# Patient Record
Sex: FEMALE | Race: WHITE | NOT HISPANIC OR LATINO
[De-identification: names, ages, dates, MRNs, and addresses within clinical notes are randomized per-mention and may not be internally consistent; named-entity substitution may affect disease eponyms.]

---

## 2017-01-13 ENCOUNTER — APPOINTMENT (OUTPATIENT)
Dept: VASCULAR SURGERY | Facility: CLINIC | Age: 82
End: 2017-01-13

## 2017-01-13 VITALS
BODY MASS INDEX: 26.75 KG/M2 | WEIGHT: 151 LBS | DIASTOLIC BLOOD PRESSURE: 76 MMHG | TEMPERATURE: 97.9 F | HEIGHT: 63 IN | HEART RATE: 77 BPM | SYSTOLIC BLOOD PRESSURE: 156 MMHG

## 2017-01-18 ENCOUNTER — APPOINTMENT (OUTPATIENT)
Dept: WOUND CARE | Facility: CLINIC | Age: 82
End: 2017-01-18

## 2017-01-18 VITALS — WEIGHT: 151 LBS | HEIGHT: 63 IN | RESPIRATION RATE: 18 BRPM | BODY MASS INDEX: 26.75 KG/M2

## 2017-02-06 ENCOUNTER — APPOINTMENT (OUTPATIENT)
Dept: WOUND CARE | Facility: CLINIC | Age: 82
End: 2017-02-06

## 2017-02-06 VITALS
SYSTOLIC BLOOD PRESSURE: 182 MMHG | DIASTOLIC BLOOD PRESSURE: 92 MMHG | RESPIRATION RATE: 18 BRPM | TEMPERATURE: 98.4 F | HEART RATE: 74 BPM

## 2017-02-27 ENCOUNTER — APPOINTMENT (OUTPATIENT)
Dept: WOUND CARE | Facility: CLINIC | Age: 82
End: 2017-02-27

## 2017-02-27 VITALS
DIASTOLIC BLOOD PRESSURE: 78 MMHG | HEIGHT: 63 IN | WEIGHT: 151 LBS | TEMPERATURE: 97.6 F | BODY MASS INDEX: 26.75 KG/M2 | SYSTOLIC BLOOD PRESSURE: 179 MMHG | HEART RATE: 77 BPM | RESPIRATION RATE: 14 BRPM

## 2017-03-29 ENCOUNTER — APPOINTMENT (OUTPATIENT)
Dept: VASCULAR SURGERY | Facility: CLINIC | Age: 82
End: 2017-03-29

## 2017-03-29 VITALS
HEART RATE: 80 BPM | SYSTOLIC BLOOD PRESSURE: 183 MMHG | HEIGHT: 63 IN | TEMPERATURE: 98 F | WEIGHT: 151 LBS | BODY MASS INDEX: 26.75 KG/M2 | DIASTOLIC BLOOD PRESSURE: 91 MMHG

## 2017-04-03 ENCOUNTER — APPOINTMENT (OUTPATIENT)
Dept: WOUND CARE | Facility: CLINIC | Age: 82
End: 2017-04-03

## 2017-04-24 ENCOUNTER — APPOINTMENT (OUTPATIENT)
Dept: OPHTHALMOLOGY | Facility: CLINIC | Age: 82
End: 2017-04-24

## 2017-08-25 ENCOUNTER — INPATIENT (INPATIENT)
Facility: HOSPITAL | Age: 82
LOS: 4 days | Discharge: NURSING, REHAB OR LONG-TERM CARE | End: 2017-08-30
Attending: HOSPITALIST | Admitting: HOSPITALIST
Payer: MEDICARE

## 2017-08-25 VITALS
OXYGEN SATURATION: 100 % | HEART RATE: 103 BPM | RESPIRATION RATE: 20 BRPM | TEMPERATURE: 101 F | DIASTOLIC BLOOD PRESSURE: 100 MMHG | SYSTOLIC BLOOD PRESSURE: 180 MMHG

## 2017-08-25 DIAGNOSIS — R50.9 FEVER, UNSPECIFIED: ICD-10-CM

## 2017-08-25 DIAGNOSIS — Z87.81 PERSONAL HISTORY OF (HEALED) TRAUMATIC FRACTURE: Chronic | ICD-10-CM

## 2017-08-25 LAB
ALBUMIN SERPL ELPH-MCNC: 3.8 G/DL — SIGNIFICANT CHANGE UP (ref 3.3–5)
ALP SERPL-CCNC: 64 U/L — SIGNIFICANT CHANGE UP (ref 40–120)
ALT FLD-CCNC: 26 U/L — SIGNIFICANT CHANGE UP (ref 4–33)
APPEARANCE UR: CLEAR — SIGNIFICANT CHANGE UP
AST SERPL-CCNC: 50 U/L — HIGH (ref 4–32)
B PERT DNA SPEC QL NAA+PROBE: SIGNIFICANT CHANGE UP
BACTERIA # UR AUTO: SIGNIFICANT CHANGE UP
BASE EXCESS BLDV CALC-SCNC: 9.3 MMOL/L — SIGNIFICANT CHANGE UP
BILIRUB SERPL-MCNC: 0.8 MG/DL — SIGNIFICANT CHANGE UP (ref 0.2–1.2)
BILIRUB UR-MCNC: NEGATIVE — SIGNIFICANT CHANGE UP
BLOOD GAS VENOUS - CREATININE: 0.64 MG/DL — SIGNIFICANT CHANGE UP (ref 0.5–1.3)
BLOOD UR QL VISUAL: NEGATIVE — SIGNIFICANT CHANGE UP
BUN SERPL-MCNC: 16 MG/DL — SIGNIFICANT CHANGE UP (ref 7–23)
C PNEUM DNA SPEC QL NAA+PROBE: NOT DETECTED — SIGNIFICANT CHANGE UP
CALCIUM SERPL-MCNC: 9 MG/DL — SIGNIFICANT CHANGE UP (ref 8.4–10.5)
CHLORIDE BLDV-SCNC: 96 MMOL/L — SIGNIFICANT CHANGE UP (ref 96–108)
CHLORIDE SERPL-SCNC: 92 MMOL/L — LOW (ref 98–107)
CO2 SERPL-SCNC: 30 MMOL/L — SIGNIFICANT CHANGE UP (ref 22–31)
COLOR SPEC: SIGNIFICANT CHANGE UP
CREAT SERPL-MCNC: 0.68 MG/DL — SIGNIFICANT CHANGE UP (ref 0.5–1.3)
FLUAV H1 2009 PAND RNA SPEC QL NAA+PROBE: NOT DETECTED — SIGNIFICANT CHANGE UP
FLUAV H1 RNA SPEC QL NAA+PROBE: NOT DETECTED — SIGNIFICANT CHANGE UP
FLUAV H3 RNA SPEC QL NAA+PROBE: NOT DETECTED — SIGNIFICANT CHANGE UP
FLUAV SUBTYP SPEC NAA+PROBE: SIGNIFICANT CHANGE UP
FLUBV RNA SPEC QL NAA+PROBE: NOT DETECTED — SIGNIFICANT CHANGE UP
GAS PNL BLDV: 130 MMOL/L — LOW (ref 136–146)
GLUCOSE BLDV-MCNC: 102 — HIGH (ref 70–99)
GLUCOSE SERPL-MCNC: 99 MG/DL — SIGNIFICANT CHANGE UP (ref 70–99)
GLUCOSE UR-MCNC: NEGATIVE — SIGNIFICANT CHANGE UP
HADV DNA SPEC QL NAA+PROBE: NOT DETECTED — SIGNIFICANT CHANGE UP
HCO3 BLDV-SCNC: 30 MMOL/L — HIGH (ref 20–27)
HCOV 229E RNA SPEC QL NAA+PROBE: NOT DETECTED — SIGNIFICANT CHANGE UP
HCOV HKU1 RNA SPEC QL NAA+PROBE: NOT DETECTED — SIGNIFICANT CHANGE UP
HCOV NL63 RNA SPEC QL NAA+PROBE: NOT DETECTED — SIGNIFICANT CHANGE UP
HCOV OC43 RNA SPEC QL NAA+PROBE: NOT DETECTED — SIGNIFICANT CHANGE UP
HCT VFR BLD CALC: 44 % — SIGNIFICANT CHANGE UP (ref 34.5–45)
HCT VFR BLDV CALC: 45.1 % — HIGH (ref 34.5–45)
HGB BLD-MCNC: 14.5 G/DL — SIGNIFICANT CHANGE UP (ref 11.5–15.5)
HGB BLDV-MCNC: 14.7 G/DL — SIGNIFICANT CHANGE UP (ref 11.5–15.5)
HMPV RNA SPEC QL NAA+PROBE: NOT DETECTED — SIGNIFICANT CHANGE UP
HPIV1 RNA SPEC QL NAA+PROBE: NOT DETECTED — SIGNIFICANT CHANGE UP
HPIV2 RNA SPEC QL NAA+PROBE: NOT DETECTED — SIGNIFICANT CHANGE UP
HPIV3 RNA SPEC QL NAA+PROBE: NOT DETECTED — SIGNIFICANT CHANGE UP
HPIV4 RNA SPEC QL NAA+PROBE: NOT DETECTED — SIGNIFICANT CHANGE UP
KETONES UR-MCNC: NEGATIVE — SIGNIFICANT CHANGE UP
LACTATE BLDV-MCNC: 1.7 MMOL/L — SIGNIFICANT CHANGE UP (ref 0.5–2)
LEUKOCYTE ESTERASE UR-ACNC: NEGATIVE — SIGNIFICANT CHANGE UP
M PNEUMO DNA SPEC QL NAA+PROBE: NOT DETECTED — SIGNIFICANT CHANGE UP
MCHC RBC-ENTMCNC: 31.8 PG — SIGNIFICANT CHANGE UP (ref 27–34)
MCHC RBC-ENTMCNC: 33 % — SIGNIFICANT CHANGE UP (ref 32–36)
MCV RBC AUTO: 96.5 FL — SIGNIFICANT CHANGE UP (ref 80–100)
NITRITE UR-MCNC: NEGATIVE — SIGNIFICANT CHANGE UP
NRBC # FLD: 0 — SIGNIFICANT CHANGE UP
PCO2 BLDV: 60 MMHG — HIGH (ref 41–51)
PH BLDV: 7.38 PH — SIGNIFICANT CHANGE UP (ref 7.32–7.43)
PH UR: 7.5 — SIGNIFICANT CHANGE UP (ref 4.6–8)
PLATELET # BLD AUTO: 206 K/UL — SIGNIFICANT CHANGE UP (ref 150–400)
PMV BLD: 9.4 FL — SIGNIFICANT CHANGE UP (ref 7–13)
PO2 BLDV: 30 MMHG — LOW (ref 35–40)
POTASSIUM BLDV-SCNC: 3.8 MMOL/L — SIGNIFICANT CHANGE UP (ref 3.4–4.5)
POTASSIUM SERPL-MCNC: 4.5 MMOL/L — SIGNIFICANT CHANGE UP (ref 3.5–5.3)
POTASSIUM SERPL-SCNC: 4.5 MMOL/L — SIGNIFICANT CHANGE UP (ref 3.5–5.3)
PROT SERPL-MCNC: 7 G/DL — SIGNIFICANT CHANGE UP (ref 6–8.3)
PROT UR-MCNC: 300 — HIGH
RBC # BLD: 4.56 M/UL — SIGNIFICANT CHANGE UP (ref 3.8–5.2)
RBC # FLD: 14.2 % — SIGNIFICANT CHANGE UP (ref 10.3–14.5)
RSV RNA SPEC QL NAA+PROBE: NOT DETECTED — SIGNIFICANT CHANGE UP
RV+EV RNA SPEC QL NAA+PROBE: NOT DETECTED — SIGNIFICANT CHANGE UP
SAO2 % BLDV: 49 % — LOW (ref 60–85)
SODIUM SERPL-SCNC: 134 MMOL/L — LOW (ref 135–145)
SP GR SPEC: 1.01 — SIGNIFICANT CHANGE UP (ref 1–1.03)
UROBILINOGEN FLD QL: NORMAL E.U. — SIGNIFICANT CHANGE UP (ref 0.1–0.2)
WBC # BLD: 15.35 K/UL — HIGH (ref 3.8–10.5)
WBC # FLD AUTO: 15.35 K/UL — HIGH (ref 3.8–10.5)
WBC CASTS UR QL COMP ASSIST: SIGNIFICANT CHANGE UP (ref 0–?)
WBC UR QL: SIGNIFICANT CHANGE UP (ref 0–?)

## 2017-08-25 PROCEDURE — 71020: CPT | Mod: 26

## 2017-08-25 PROCEDURE — 99223 1ST HOSP IP/OBS HIGH 75: CPT | Mod: AI

## 2017-08-25 PROCEDURE — 71250 CT THORAX DX C-: CPT | Mod: 26

## 2017-08-25 RX ORDER — VANCOMYCIN HCL 1 G
1000 VIAL (EA) INTRAVENOUS ONCE
Qty: 0 | Refills: 0 | Status: COMPLETED | OUTPATIENT
Start: 2017-08-25 | End: 2017-08-25

## 2017-08-25 RX ORDER — ONDANSETRON 8 MG/1
4 TABLET, FILM COATED ORAL ONCE
Qty: 0 | Refills: 0 | Status: COMPLETED | OUTPATIENT
Start: 2017-08-25 | End: 2017-08-25

## 2017-08-25 RX ORDER — ACETAMINOPHEN 500 MG
650 TABLET ORAL ONCE
Qty: 0 | Refills: 0 | Status: DISCONTINUED | OUTPATIENT
Start: 2017-08-25 | End: 2017-08-25

## 2017-08-25 RX ORDER — SODIUM CHLORIDE 9 MG/ML
500 INJECTION INTRAMUSCULAR; INTRAVENOUS; SUBCUTANEOUS ONCE
Qty: 0 | Refills: 0 | Status: COMPLETED | OUTPATIENT
Start: 2017-08-25 | End: 2017-08-25

## 2017-08-25 RX ORDER — PIPERACILLIN AND TAZOBACTAM 4; .5 G/20ML; G/20ML
3.38 INJECTION, POWDER, LYOPHILIZED, FOR SOLUTION INTRAVENOUS ONCE
Qty: 0 | Refills: 0 | Status: COMPLETED | OUTPATIENT
Start: 2017-08-25 | End: 2017-08-25

## 2017-08-25 RX ORDER — ACETAMINOPHEN 500 MG
650 TABLET ORAL ONCE
Qty: 0 | Refills: 0 | Status: COMPLETED | OUTPATIENT
Start: 2017-08-25 | End: 2017-08-25

## 2017-08-25 RX ORDER — SODIUM CHLORIDE 9 MG/ML
250 INJECTION INTRAMUSCULAR; INTRAVENOUS; SUBCUTANEOUS ONCE
Qty: 0 | Refills: 0 | Status: DISCONTINUED | OUTPATIENT
Start: 2017-08-25 | End: 2017-08-25

## 2017-08-25 RX ADMIN — Medication 650 MILLIGRAM(S): at 12:47

## 2017-08-25 RX ADMIN — PIPERACILLIN AND TAZOBACTAM 200 GRAM(S): 4; .5 INJECTION, POWDER, LYOPHILIZED, FOR SOLUTION INTRAVENOUS at 18:58

## 2017-08-25 RX ADMIN — ONDANSETRON 4 MILLIGRAM(S): 8 TABLET, FILM COATED ORAL at 23:15

## 2017-08-25 RX ADMIN — Medication 250 MILLIGRAM(S): at 17:35

## 2017-08-25 RX ADMIN — SODIUM CHLORIDE 500 MILLILITER(S): 9 INJECTION INTRAMUSCULAR; INTRAVENOUS; SUBCUTANEOUS at 16:06

## 2017-08-25 NOTE — ED PROVIDER NOTE - OBJECTIVE STATEMENT
89 y/o F hx of asthma, sarcoidosis on 2 L NC, presents with fevers for one day. Pt says that she was in her usual state of health, until today, she did not feel well, and endorses malaise. Pt then started to have cold chills, and felt extremely cold after shower. Pt decided to call EMS because she was not feeling well. Pt endorses SOB, but pt say she is baseline SOB at exertion. Pt says there is no change in her SOB. Pt also has chronic dry cough, which is her baseline. Pt denies any CP, h/a, n/v/d, dysuria, hematuria, melena, hematochezia. Pt has RLE growth that was recently found a few months ago. Pt was to get bx of RLE growth. 89 y/o F hx of asthma, sarcoidosis on 2 L NC, colon ca s/p resection presents with fevers for one day. Pt says that she was in her usual state of health, until today, she did not feel well, and endorses malaise. Pt then started to have cold chills, and felt extremely cold after shower. Pt decided to call EMS because she was not feeling well. Pt endorses SOB, but pt say she is baseline SOB at exertion. Pt says there is no change in her SOB. Pt also has chronic dry cough, which is her baseline. Pt denies any CP, h/a, n/v/d, dysuria, hematuria, melena, hematochezia. Pt has RLE growth that was recently found a few months ago. Pt was to get bx of RLE growth.

## 2017-08-25 NOTE — ED PROVIDER NOTE - MEDICAL DECISION MAKING DETAILS
89 y/o F hx of asthma, sarcoidosis on 2 L NC, presents with SIRS criteria, no infectious source as of now. Will obtain CXR, blood cx, UA. Pt has leukocytosis but on chronic steroids. Consider RVP.

## 2017-08-25 NOTE — ED PROVIDER NOTE - ATTENDING CONTRIBUTION TO CARE
ED Attending Dr. Collazo: 89 yo female with asthma, sarcoidosis on home O2, colon cancer s/p resection, in ED with chills today.  Pt states that she has SOB at baseline, no worse today.  On exam pt chronically-ill appearing, heart RRR, lungs mildly coarse breath sounds throughout, abd NTND, extremities without swelling, strength 5/5 in all extremities and skin without rash. +dry mucous membranes

## 2017-08-25 NOTE — ED ADULT NURSE NOTE - OBJECTIVE STATEMENT
pt to rm 20. alert,oriented x3. skin warm,dry,dryness to skin note with multiple areas discoloration. pt states present " x yrs,been to dermatologist,keratosis/skin cancers" r lower leg with  small red/ulcer,sl redness to surrounding area. cleaned covered. pt reports nausea  since yesterday. denies vomiting,pain. reports  " shakes this am".   denies cough,problems urinating. pt reports " always  a little short of breath,uses o2 as needed at home"  eval by md at present

## 2017-08-25 NOTE — ED ADULT TRIAGE NOTE - CHIEF COMPLAINT QUOTE
Brought in by ems from home for chills, fevers, productive cough and body aches. States " I haven't been feeling well the last few days". Lips appear dry, states she did not drink water today. Hx of copd, asthma, htn. Uses home 02  2 L NC.

## 2017-08-25 NOTE — ED PROVIDER NOTE - PSH
Delivery with history of     History of cataract surgery  right eye 2012  History of colon resection  , cancer never confirmed per patient  History of left shoulder fracture  , fell down stairs, also fracture of pelvis  Inguinal hernia    S/P hip replacement  left   Shoulder disorder  fell shoulder surgery left

## 2017-08-25 NOTE — ED PROVIDER NOTE - PMH
COPD (chronic obstructive pulmonary disease)    Hernia    Hyperlipemia    Hypertension    Leg ulcer  History of multiple slow-healing leg ulcers, all healed as of March 2016  Osteoporosis  multiple fractures  Sarcoidosis  pulmonary  Shingles  2012

## 2017-08-26 DIAGNOSIS — I10 ESSENTIAL (PRIMARY) HYPERTENSION: ICD-10-CM

## 2017-08-26 DIAGNOSIS — E87.1 HYPO-OSMOLALITY AND HYPONATREMIA: ICD-10-CM

## 2017-08-26 DIAGNOSIS — D86.9 SARCOIDOSIS, UNSPECIFIED: ICD-10-CM

## 2017-08-26 DIAGNOSIS — G47.00 INSOMNIA, UNSPECIFIED: ICD-10-CM

## 2017-08-26 DIAGNOSIS — R63.8 OTHER SYMPTOMS AND SIGNS CONCERNING FOOD AND FLUID INTAKE: ICD-10-CM

## 2017-08-26 DIAGNOSIS — Z29.9 ENCOUNTER FOR PROPHYLACTIC MEASURES, UNSPECIFIED: ICD-10-CM

## 2017-08-26 DIAGNOSIS — L97.909 NON-PRESSURE CHRONIC ULCER OF UNSPECIFIED PART OF UNSPECIFIED LOWER LEG WITH UNSPECIFIED SEVERITY: ICD-10-CM

## 2017-08-26 DIAGNOSIS — R50.9 FEVER, UNSPECIFIED: ICD-10-CM

## 2017-08-26 DIAGNOSIS — J45.30 MILD PERSISTENT ASTHMA, UNCOMPLICATED: ICD-10-CM

## 2017-08-26 DIAGNOSIS — A41.9 SEPSIS, UNSPECIFIED ORGANISM: ICD-10-CM

## 2017-08-26 LAB
BACTERIA UR CULT: SIGNIFICANT CHANGE UP
BUN SERPL-MCNC: 12 MG/DL — SIGNIFICANT CHANGE UP (ref 7–23)
CALCIUM SERPL-MCNC: 8.4 MG/DL — SIGNIFICANT CHANGE UP (ref 8.4–10.5)
CHLORIDE SERPL-SCNC: 94 MMOL/L — LOW (ref 98–107)
CO2 SERPL-SCNC: 29 MMOL/L — SIGNIFICANT CHANGE UP (ref 22–31)
CREAT SERPL-MCNC: 0.52 MG/DL — SIGNIFICANT CHANGE UP (ref 0.5–1.3)
GLUCOSE SERPL-MCNC: 91 MG/DL — SIGNIFICANT CHANGE UP (ref 70–99)
HCT VFR BLD CALC: 41 % — SIGNIFICANT CHANGE UP (ref 34.5–45)
HGB BLD-MCNC: 13.4 G/DL — SIGNIFICANT CHANGE UP (ref 11.5–15.5)
MCHC RBC-ENTMCNC: 31.2 PG — SIGNIFICANT CHANGE UP (ref 27–34)
MCHC RBC-ENTMCNC: 32.7 % — SIGNIFICANT CHANGE UP (ref 32–36)
MCV RBC AUTO: 95.3 FL — SIGNIFICANT CHANGE UP (ref 80–100)
NRBC # FLD: 0 — SIGNIFICANT CHANGE UP
PLATELET # BLD AUTO: 166 K/UL — SIGNIFICANT CHANGE UP (ref 150–400)
PMV BLD: 9.4 FL — SIGNIFICANT CHANGE UP (ref 7–13)
POTASSIUM SERPL-MCNC: 4.2 MMOL/L — SIGNIFICANT CHANGE UP (ref 3.5–5.3)
POTASSIUM SERPL-SCNC: 4.2 MMOL/L — SIGNIFICANT CHANGE UP (ref 3.5–5.3)
RBC # BLD: 4.3 M/UL — SIGNIFICANT CHANGE UP (ref 3.8–5.2)
RBC # FLD: 14.5 % — SIGNIFICANT CHANGE UP (ref 10.3–14.5)
SODIUM SERPL-SCNC: 134 MMOL/L — LOW (ref 135–145)
SPECIMEN SOURCE: SIGNIFICANT CHANGE UP
WBC # BLD: 8.81 K/UL — SIGNIFICANT CHANGE UP (ref 3.8–10.5)
WBC # FLD AUTO: 8.81 K/UL — SIGNIFICANT CHANGE UP (ref 3.8–10.5)

## 2017-08-26 PROCEDURE — 99233 SBSQ HOSP IP/OBS HIGH 50: CPT

## 2017-08-26 RX ORDER — CEFTRIAXONE 500 MG/1
INJECTION, POWDER, FOR SOLUTION INTRAMUSCULAR; INTRAVENOUS
Qty: 0 | Refills: 0 | Status: DISCONTINUED | OUTPATIENT
Start: 2017-08-26 | End: 2017-08-27

## 2017-08-26 RX ORDER — AZITHROMYCIN 500 MG/1
500 TABLET, FILM COATED ORAL ONCE
Qty: 0 | Refills: 0 | Status: COMPLETED | OUTPATIENT
Start: 2017-08-26 | End: 2017-08-26

## 2017-08-26 RX ORDER — ALPRAZOLAM 0.25 MG
0.25 TABLET ORAL AT BEDTIME
Qty: 0 | Refills: 0 | Status: DISCONTINUED | OUTPATIENT
Start: 2017-08-26 | End: 2017-08-30

## 2017-08-26 RX ORDER — IPRATROPIUM/ALBUTEROL SULFATE 18-103MCG
3 AEROSOL WITH ADAPTER (GRAM) INHALATION EVERY 6 HOURS
Qty: 0 | Refills: 0 | Status: DISCONTINUED | OUTPATIENT
Start: 2017-08-26 | End: 2017-08-30

## 2017-08-26 RX ORDER — MONTELUKAST 4 MG/1
10 TABLET, CHEWABLE ORAL DAILY
Qty: 0 | Refills: 0 | Status: DISCONTINUED | OUTPATIENT
Start: 2017-08-26 | End: 2017-08-30

## 2017-08-26 RX ORDER — CEFTRIAXONE 500 MG/1
1 INJECTION, POWDER, FOR SOLUTION INTRAMUSCULAR; INTRAVENOUS ONCE
Qty: 0 | Refills: 0 | Status: DISCONTINUED | OUTPATIENT
Start: 2017-08-26 | End: 2017-08-27

## 2017-08-26 RX ORDER — AZITHROMYCIN 500 MG/1
TABLET, FILM COATED ORAL
Qty: 0 | Refills: 0 | Status: DISCONTINUED | OUTPATIENT
Start: 2017-08-26 | End: 2017-08-26

## 2017-08-26 RX ORDER — ERGOCALCIFEROL 1.25 MG/1
50000 CAPSULE ORAL
Qty: 0 | Refills: 0 | Status: DISCONTINUED | OUTPATIENT
Start: 2017-08-26 | End: 2017-08-30

## 2017-08-26 RX ORDER — AZITHROMYCIN 500 MG/1
250 TABLET, FILM COATED ORAL DAILY
Qty: 0 | Refills: 0 | Status: DISCONTINUED | OUTPATIENT
Start: 2017-08-27 | End: 2017-08-28

## 2017-08-26 RX ORDER — SIMVASTATIN 20 MG/1
10 TABLET, FILM COATED ORAL AT BEDTIME
Qty: 0 | Refills: 0 | Status: DISCONTINUED | OUTPATIENT
Start: 2017-08-26 | End: 2017-08-30

## 2017-08-26 RX ORDER — CEFTRIAXONE 500 MG/1
1 INJECTION, POWDER, FOR SOLUTION INTRAMUSCULAR; INTRAVENOUS ONCE
Qty: 0 | Refills: 0 | Status: COMPLETED | OUTPATIENT
Start: 2017-08-26 | End: 2017-08-26

## 2017-08-26 RX ORDER — CEFTRIAXONE 500 MG/1
INJECTION, POWDER, FOR SOLUTION INTRAMUSCULAR; INTRAVENOUS
Qty: 0 | Refills: 0 | Status: DISCONTINUED | OUTPATIENT
Start: 2017-08-26 | End: 2017-08-26

## 2017-08-26 RX ORDER — ENOXAPARIN SODIUM 100 MG/ML
30 INJECTION SUBCUTANEOUS EVERY 24 HOURS
Qty: 0 | Refills: 0 | Status: DISCONTINUED | OUTPATIENT
Start: 2017-08-26 | End: 2017-08-30

## 2017-08-26 RX ORDER — CEFTRIAXONE 500 MG/1
1 INJECTION, POWDER, FOR SOLUTION INTRAMUSCULAR; INTRAVENOUS EVERY 24 HOURS
Qty: 0 | Refills: 0 | Status: DISCONTINUED | OUTPATIENT
Start: 2017-08-27 | End: 2017-08-27

## 2017-08-26 RX ADMIN — Medication 1 TABLET(S): at 12:10

## 2017-08-26 RX ADMIN — SIMVASTATIN 10 MILLIGRAM(S): 20 TABLET, FILM COATED ORAL at 21:47

## 2017-08-26 RX ADMIN — AZITHROMYCIN 250 MILLIGRAM(S): 500 TABLET, FILM COATED ORAL at 02:10

## 2017-08-26 RX ADMIN — Medication 5 MILLIGRAM(S): at 05:54

## 2017-08-26 RX ADMIN — MONTELUKAST 10 MILLIGRAM(S): 4 TABLET, CHEWABLE ORAL at 12:10

## 2017-08-26 RX ADMIN — Medication 0.25 MILLIGRAM(S): at 22:43

## 2017-08-26 RX ADMIN — CEFTRIAXONE 100 GRAM(S): 500 INJECTION, POWDER, FOR SOLUTION INTRAMUSCULAR; INTRAVENOUS at 01:40

## 2017-08-26 NOTE — H&P ADULT - PROBLEM SELECTOR PLAN 1
Febrile, leukocytosis (may be due to steroids), no clear source, CXR and UA unremarkable, no GI symptoms. Ordered CT chest as patient w/ increased SOB Xweeks, treat for CAP for now, if negative will d/c and observe. Febrile, leukocytosis (may be due to steroids), no clear source, CXR and UA unremarkable, no GI symptoms. Ordered CT chest as patient w/ increased SOB Xweeks, treat for CAP for now (ceftriaxone and azithro) if negative will d/c and observe off antibiotics.

## 2017-08-26 NOTE — H&P ADULT - ASSESSMENT
Patient is a 89 y/o F PMH Asthma (daily duonebs), Sarcoidosis on 2 L NC, Colon CA s/p resection, HTN, HLD, p/w fevers X1 day

## 2017-08-26 NOTE — H&P ADULT - NSHPPHYSICALEXAM_GEN_ALL_CORE
T(C): 36.8 (08-25-17 @ 22:36), Max: 39.6 (08-25-17 @ 12:47)  HR: 79 (08-25-17 @ 22:36) (60 - 103)  BP: 134/89 (08-25-17 @ 22:36) (114/52 - 180/100)  RR: 20 (08-25-17 @ 22:36) (18 - 31)  SpO2: 100% (08-25-17 @ 22:36) (97% - 100%)      GENERAL: NAD, well-developed, well-nourished  HEAD:  Atraumatic, Normocephalic  HEENT: EOMI, PERRLA, conjunctiva and sclera clear  NECK: Supple, No JVD  CHEST/LUNG: Clear to auscultation bilaterally; No wheezes, rales or rhonchi  HEART: Regular rate and rhythm; No murmurs, rubs, or gallops, (+)S1, S2  ABDOMEN: Soft, Nontender, Nondistended; Normal Bowel sounds   EXTREMITIES:  2+ Peripheral Pulses, No clubbing, cyanosis, or edema  PSYCH: normal mood and affect  NEUROLOGY: AAOx3, non-focal  SKIN: No rashes or lesions

## 2017-08-26 NOTE — H&P ADULT - HISTORY OF PRESENT ILLNESS
Patient is a 89 y/o F PMH Asthma (daily duonebs), Sarcoidosis on 2 L NC, Colon CA s/p resection, HTN, HLD, p/w fevers X1 day. Patient reports feeling ill last night (8/24). Cannot describe, however, reports feeling tired. Woke up this AM (8/25) and after her shower experienced severe chills/rigors, was concerned that she would fall and came to ER. Reports baseline SOB and cough. Is on prednisone 5 mg PO daily [id number] for her Sarcoidosis. Denies any N/V/chest pain/palp/abd pain/changes in BM/dysuria. No sick contacts, no travel history.

## 2017-08-26 NOTE — PROVIDER CONTACT NOTE (OTHER) - ACTION/TREATMENT ORDERED:
Provider aware, Zofran 4mg IV Push once ordered Provider aware, Zofran 4mg IV Push once ordered by provider

## 2017-08-26 NOTE — H&P ADULT - NSHPLABSRESULTS_GEN_ALL_CORE
134<L>  |  92<L>  |  16  ----------------------------<  99  4.5   |  30  |  0.68    Ca    9.0      25 Aug 2017 12:05    TPro  7.0  /  Alb  3.8  /  TBili  0.8  /  DBili  x   /  AST  50<H>  /  ALT  26  /  AlkPhos  64                              14.5   15.35 )-----------( 206      ( 25 Aug 2017 12:05 )             44.0       LIVER FUNCTIONS - ( 25 Aug 2017 12:05 )  Alb: 3.8 g/dL / Pro: 7.0 g/dL / ALK PHOS: 64 u/L / ALT: 26 u/L / AST: 50 u/L / GGT: x             Urinalysis Basic - ( 25 Aug 2017 12:40 )    Color: PLYEL / Appearance: CLEAR / S.013 / pH: 7.5  Gluc: NEGATIVE / Ketone: NEGATIVE  / Bili: NEGATIVE / Urobili: NORMAL E.U.   Blood: NEGATIVE / Protein: 300 / Nitrite: NEGATIVE   Leuk Esterase: NEGATIVE / RBC: x / WBC 0-2   Sq Epi: x / Non Sq Epi: x / Bacteria: FEW      12:05 - VBG - pH: 7.38  | pCO2: 60    | pO2: 30    | Lactate: 1.7    EKG personally reviewed, NSR, HR 70s w/ PACs, no sig change from 2016

## 2017-08-27 ENCOUNTER — TRANSCRIPTION ENCOUNTER (OUTPATIENT)
Age: 82
End: 2017-08-27

## 2017-08-27 LAB — PROCALCITONIN SERPL-MCNC: 0.62 NG/ML — HIGH (ref 0–0.04)

## 2017-08-27 PROCEDURE — 99233 SBSQ HOSP IP/OBS HIGH 50: CPT

## 2017-08-27 RX ADMIN — Medication 1 TABLET(S): at 06:12

## 2017-08-27 RX ADMIN — MONTELUKAST 10 MILLIGRAM(S): 4 TABLET, CHEWABLE ORAL at 12:34

## 2017-08-27 RX ADMIN — Medication 1 TABLET(S): at 17:21

## 2017-08-27 RX ADMIN — Medication 1 TABLET(S): at 12:34

## 2017-08-27 RX ADMIN — Medication 5 MILLIGRAM(S): at 06:12

## 2017-08-27 RX ADMIN — AZITHROMYCIN 250 MILLIGRAM(S): 500 TABLET, FILM COATED ORAL at 13:20

## 2017-08-27 RX ADMIN — SIMVASTATIN 10 MILLIGRAM(S): 20 TABLET, FILM COATED ORAL at 21:30

## 2017-08-27 RX ADMIN — Medication 0.25 MILLIGRAM(S): at 21:30

## 2017-08-27 NOTE — PROGRESS NOTE ADULT - PROBLEM SELECTOR PLAN 7
No IVF  Replete electrolytes as needed  Regular Diet   Dispo pending clinical progress No IVF  Replete electrolytes as needed  Regular Diet   DISPO Pending clinical status and PT evaluation   Dispo pending clinical progress No IVF  Replete electrolytes as needed  Regular Diet   DISPO Pending clinical status and PT evaluation for weakness

## 2017-08-27 NOTE — DISCHARGE NOTE ADULT - HOSPITAL COURSE
89 y/o F PMH Asthma (daily duonebs), Sarcoidosis on 2 L NC, also prednisone, Colon CA s/p resection, HTN, HLD, with c/o fever, malaise found to have Pneumonia on CT chest. 89 y/o F PMH Asthma (daily duonebs), Sarcoidosis on 2 L NC, also prednisone, Colon CA s/p resection, HTN, HLD, with c/o fever, malaise found to have Pneumonia on CT chest.  treated w/ abx- seen by pulm- pt w/ restrictive lung disease- did not get higher dose of prednisone.  pt to go to rehab for deconditioning.  overwhelmed that her "lungs are bad" but is known to have chronic lung issues and that her family support is limited.

## 2017-08-27 NOTE — DISCHARGE NOTE ADULT - MEDICATION SUMMARY - MEDICATIONS TO TAKE
I will START or STAY ON the medications listed below when I get home from the hospital:    predniSONE 5 mg oral tablet  -- 1 tab(s) by mouth once a day  -- Indication: For Sarcoidosis    enalapril 5 mg oral tablet  -- 1 tab(s) by mouth once a day  -- Indication: For HTN    Zocor 10 mg oral tablet  -- 1 tab(s) by mouth once a day (at bedtime)  -- Indication: For HLD     ALPRAZolam 0.25 mg oral tablet  -- 1 tab(s) by mouth once a day (at bedtime)  -- Indication: For Anxiety     Ventolin HFA 90 mcg/inh inhalation aerosol  -- 2 puff(s) inhaled 4 times a day, As Needed  -- Indication: For COPD (chronic obstructive pulmonary disease)    albuterol-ipratropium 2.5 mg-0.5 mg/3 mL inhalation solution  -- 3 milliliter(s) inhaled every 6 hours, As needed, Shortness of Breath and/or Wheezing  -- Indication: For COPD (chronic obstructive pulmonary disease)    polyethylene glycol 3350 oral powder for reconstitution  -- 17 gram(s) by mouth once a day  -- Indication: For Constipation    docusate sodium 100 mg oral capsule  -- 1 cap(s) by mouth 3 times a day  -- Indication: For Constipation    senna oral tablet  -- 2 tab(s) by mouth once a day (at bedtime)  -- Indication: For Constipation    Singulair 10 mg oral tablet  -- 1 tab(s) by mouth once a day  -- Indication: For COPD    ocular lubricant ophthalmic solution  -- 1 drop(s) to each affected eye every 6 hours, As needed, Dry Eyes  -- Indication: For Dry eyes     levoFLOXacin 500 mg oral tablet  -- 1 tab(s) by mouth every 24 hours  dc after 8/31 dose  -- Indication: For PNA     Multiple Vitamins oral tablet  -- 1 tab(s) by mouth once a day  -- Indication: For Supplement     Vitamin D2 50,000 intl units (1.25 mg) oral capsule  -- 1 cap(s) by mouth once a week on Wednesday  -- Indication: For Supplement

## 2017-08-27 NOTE — DISCHARGE NOTE ADULT - PATIENT PORTAL LINK FT
“You can access the [facility] Patient Portal, offered by [facility], by registering with the following website: [email or web address]”

## 2017-08-27 NOTE — DISCHARGE NOTE ADULT - PLAN OF CARE
Resolved follow up with your doctor within 1 week of discharge without difficulty breathing continue Nebulizer and supplemental oxygen SBP<150 continue Prednisone continue Zocor Improved

## 2017-08-27 NOTE — DISCHARGE NOTE ADULT - SECONDARY DIAGNOSIS.
COPD (chronic obstructive pulmonary disease) Essential hypertension Sarcoidosis Hyponatremia Hyperlipemia

## 2017-08-27 NOTE — DISCHARGE NOTE ADULT - CARE PLAN
Principal Discharge DX:	Sepsis due to pneumonia  Goal:	Resolved  Instructions for follow-up, activity and diet:	follow up with your doctor within 1 week of discharge  Secondary Diagnosis:	COPD (chronic obstructive pulmonary disease)  Goal:	without difficulty breathing  Instructions for follow-up, activity and diet:	continue Nebulizer and supplemental oxygen  Secondary Diagnosis:	Essential hypertension  Goal:	SBP<150  Secondary Diagnosis:	Sarcoidosis  Instructions for follow-up, activity and diet:	continue Prednisone  Secondary Diagnosis:	Hyponatremia  Secondary Diagnosis:	Hyperlipemia  Instructions for follow-up, activity and diet:	continue Zocor Principal Discharge DX:	Sepsis due to pneumonia  Goal:	Resolved  Instructions for follow-up, activity and diet:	follow up with your doctor within 1 week of discharge  Secondary Diagnosis:	COPD (chronic obstructive pulmonary disease)  Goal:	without difficulty breathing  Instructions for follow-up, activity and diet:	continue Nebulizer and supplemental oxygen  Secondary Diagnosis:	Essential hypertension  Goal:	SBP<150  Secondary Diagnosis:	Sarcoidosis  Instructions for follow-up, activity and diet:	continue Prednisone  Secondary Diagnosis:	Hyponatremia  Goal:	Improved  Secondary Diagnosis:	Hyperlipemia  Instructions for follow-up, activity and diet:	continue Zocor

## 2017-08-28 LAB
BUN SERPL-MCNC: 19 MG/DL — SIGNIFICANT CHANGE UP (ref 7–23)
CALCIUM SERPL-MCNC: 8.5 MG/DL — SIGNIFICANT CHANGE UP (ref 8.4–10.5)
CHLORIDE SERPL-SCNC: 96 MMOL/L — LOW (ref 98–107)
CO2 SERPL-SCNC: 35 MMOL/L — HIGH (ref 22–31)
CREAT SERPL-MCNC: 0.56 MG/DL — SIGNIFICANT CHANGE UP (ref 0.5–1.3)
GLUCOSE SERPL-MCNC: 104 MG/DL — HIGH (ref 70–99)
HCT VFR BLD CALC: 43.5 % — SIGNIFICANT CHANGE UP (ref 34.5–45)
HGB BLD-MCNC: 13.8 G/DL — SIGNIFICANT CHANGE UP (ref 11.5–15.5)
MCHC RBC-ENTMCNC: 31.6 PG — SIGNIFICANT CHANGE UP (ref 27–34)
MCHC RBC-ENTMCNC: 31.7 % — LOW (ref 32–36)
MCV RBC AUTO: 99.5 FL — SIGNIFICANT CHANGE UP (ref 80–100)
NRBC # FLD: 0 — SIGNIFICANT CHANGE UP
PLATELET # BLD AUTO: 183 K/UL — SIGNIFICANT CHANGE UP (ref 150–400)
PMV BLD: 9.2 FL — SIGNIFICANT CHANGE UP (ref 7–13)
POTASSIUM SERPL-MCNC: 4.5 MMOL/L — SIGNIFICANT CHANGE UP (ref 3.5–5.3)
POTASSIUM SERPL-SCNC: 4.5 MMOL/L — SIGNIFICANT CHANGE UP (ref 3.5–5.3)
RBC # BLD: 4.37 M/UL — SIGNIFICANT CHANGE UP (ref 3.8–5.2)
RBC # FLD: 14 % — SIGNIFICANT CHANGE UP (ref 10.3–14.5)
SODIUM SERPL-SCNC: 138 MMOL/L — SIGNIFICANT CHANGE UP (ref 135–145)
WBC # BLD: 6.62 K/UL — SIGNIFICANT CHANGE UP (ref 3.8–10.5)
WBC # FLD AUTO: 6.62 K/UL — SIGNIFICANT CHANGE UP (ref 3.8–10.5)

## 2017-08-28 PROCEDURE — 99233 SBSQ HOSP IP/OBS HIGH 50: CPT

## 2017-08-28 RX ORDER — DOCUSATE SODIUM 100 MG
100 CAPSULE ORAL THREE TIMES A DAY
Qty: 0 | Refills: 0 | Status: DISCONTINUED | OUTPATIENT
Start: 2017-08-28 | End: 2017-08-30

## 2017-08-28 RX ORDER — SENNA PLUS 8.6 MG/1
2 TABLET ORAL AT BEDTIME
Qty: 0 | Refills: 0 | Status: DISCONTINUED | OUTPATIENT
Start: 2017-08-28 | End: 2017-08-30

## 2017-08-28 RX ORDER — POLYETHYLENE GLYCOL 3350 17 G/17G
17 POWDER, FOR SOLUTION ORAL DAILY
Qty: 0 | Refills: 0 | Status: DISCONTINUED | OUTPATIENT
Start: 2017-08-28 | End: 2017-08-30

## 2017-08-28 RX ORDER — CEFTRIAXONE 500 MG/1
1 INJECTION, POWDER, FOR SOLUTION INTRAMUSCULAR; INTRAVENOUS EVERY 24 HOURS
Qty: 0 | Refills: 0 | Status: DISCONTINUED | OUTPATIENT
Start: 2017-08-28 | End: 2017-08-28

## 2017-08-28 RX ADMIN — Medication 3 MILLILITER(S): at 07:29

## 2017-08-28 RX ADMIN — Medication 0.25 MILLIGRAM(S): at 22:53

## 2017-08-28 RX ADMIN — Medication 1 TABLET(S): at 12:15

## 2017-08-28 RX ADMIN — Medication 5 MILLIGRAM(S): at 05:52

## 2017-08-28 RX ADMIN — ERGOCALCIFEROL 50000 UNIT(S): 1.25 CAPSULE ORAL at 09:19

## 2017-08-28 RX ADMIN — SIMVASTATIN 10 MILLIGRAM(S): 20 TABLET, FILM COATED ORAL at 22:54

## 2017-08-28 RX ADMIN — MONTELUKAST 10 MILLIGRAM(S): 4 TABLET, CHEWABLE ORAL at 12:15

## 2017-08-28 RX ADMIN — AZITHROMYCIN 250 MILLIGRAM(S): 500 TABLET, FILM COATED ORAL at 12:15

## 2017-08-28 RX ADMIN — Medication 1 TABLET(S): at 05:52

## 2017-08-28 RX ADMIN — Medication 100 MILLIGRAM(S): at 22:50

## 2017-08-28 NOTE — CONSULT NOTE ADULT - ASSESSMENT
Mr. 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 s/p resection, HTN, presents here for a fever for 1-2 days, and mild worsening of her sputum.     From her hx, PFTs, and imaging, the patient has severe lung disease from her sarcoidosis. The CT shows significant distortion of the lung parenchymal and some fibrosis. Also shows a small new consolidation, suggestive of possible infection. Given her fevers, initial leukocytosis, it is reasonable to treat for a pneumonia. The patient is very hesitant to increase her dose of steroids as in the past she had multiple side effects. Given distortion and fibrosis without any significant GGOs on CT, I do not think that increasing her steroids will help improve her sx. She is not wheezing at the moment either. Increasing her steroids may worsen her skin disease and healing of her ulcers.     She likely also has significant pHTN from the size of the pulm artery on CT. Do not see any ECHOs our system, however, this is chronic and not contributing to her acute fevers.     - agree with short course of levofloxacin - total of 5 d. do not think she needs MRSA coverage right now.   - duonebs daily [per patient - home dose]   - c/w her home singular   - c/w home prednisone 5 mg qd.   - will try to get more collateral from her pulmonologist re: pHTN, past treatments, etc.   - after d/c she should f/u with Dr. Jeong.    will discuss with attg, Dr. Marie in the morning.     Ti Cai MD   Pulmonary Fellow Mr. 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 s/p resection, HTN, presents here for a fever for 1-2 days, and mild worsening of her sputum.     From her hx, PFTs, and imaging, the patient has severe lung disease from her sarcoidosis. The CT shows significant distortion of the lung parenchymal and some fibrosis. Also shows a small new consolidation, suggestive of possible infection. Given her fevers, initial leukocytosis, it is reasonable to treat for a pneumonia. The patient is very hesitant to increase her dose of steroids as in the past she had multiple side effects. Given distortion and fibrosis without any significant GGOs on CT, I do not think that increasing her steroids will help improve her sx. She is not wheezing at the moment either. Increasing her steroids may worsen her skin disease and healing of her ulcers.     She likely also has significant pHTN from the size of the pulm artery on CT. Do not see any ECHOs our system, however, this is chronic and not contributing to her acute fevers.     - agree with short course of levofloxacin - total of 7 d. do not think she needs MRSA coverage right now.   - duonebs daily [per patient - home dose]   - c/w her home singular   - c/w home prednisone 5 mg qd.   - will try to get more collateral from her pulmonologist re: pHTN, past treatments, etc.   - after d/c she should f/u with Dr. Jeong.      Ti Cai MD   Pulmonary Fellow

## 2017-08-28 NOTE — CONSULT NOTE ADULT - SUBJECTIVE AND OBJECTIVE BOX
CHIEF COMPLAINT: shortness of breath, ?pneumonia.     HPI: Ms. 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 on chronic prednisone 5 mg qd for the last month, colon cancer s/p resection,    PAST MEDICAL & SURGICAL HISTORY:  Leg ulcer: History of multiple slow-healing leg ulcers, all healed as of 2016  Osteoporosis: multiple fractures  Hernia  Shingles:   Sarcoidosis: pulmonary  Hypertension  COPD (chronic obstructive pulmonary disease)  Hyperlipemia  History of left shoulder fracture: , fell down stairs, also fracture of pelvis  Shoulder disorder: fell shoulder surgery left   Delivery with history of   History of cataract surgery: right eye   S/P hip replacement: left   Inguinal hernia  History of colon resection: , cancer never confirmed per patient      FAMILY HISTORY:  No significant family history      SOCIAL HISTORY:  Smoking: __ packs x ___ years  EtOH Use:  Marital Status:  Occupation:  Recent Travel:  Country of Birth:  Advance Directives:    Allergies  No Known Allergies    HOME MEDICATIONS:      REVIEW OF SYSTEMS:  Constitutional:   Eyes:  ENT:  CV:  Resp:  GI:  :  MSK:  Integumentary:  Neurological:  Psychiatric:  Endocrine:  Hematologic/Lymphatic:  Allergic/Immunologic:  [ ] All other systems negative  [ ] Unable to assess ROS because ________    OBJECTIVE:  ICU Vital Signs Last 24 Hrs  T(C): 37.1 (28 Aug 2017 05:14), Max: 37.2 (27 Aug 2017 22:00)  T(F): 98.7 (28 Aug 2017 05:14), Max: 99 (27 Aug 2017 22:00)  HR: 84 (28 Aug 2017 07:29) (77 - 90)  BP: 124/62 (28 Aug 2017 07:44) (124/62 - 164/78)  RR: 18 (28 Aug 2017 05:14) (18 - 18)  SpO2: 100% (28 Aug 2017 05:14) (99% - 100%)    CAPILLARY BLOOD GLUCOSE      PHYSICAL EXAM:  General:   HEENT:   Lymph Nodes:  Neck:   Respiratory:   Cardiovascular:   Abdomen:   Extremities:   Skin:   Neurological:  Psychiatry:    HOSPITAL MEDICATIONS:  MEDICATIONS  (STANDING):  enoxaparin Injectable 30 milliGRAM(s) SubCutaneous every 24 hours  predniSONE   Tablet 5 milliGRAM(s) Oral daily  enalapril 5 milliGRAM(s) Oral daily  simvastatin 10 milliGRAM(s) Oral at bedtime  ALPRAZolam 0.25 milliGRAM(s) Oral at bedtime  montelukast 10 milliGRAM(s) Oral daily  multivitamin 1 Tablet(s) Oral daily  ergocalciferol 10724 Unit(s) Oral <User Schedule>  levoFLOXacin  Tablet 500 milliGRAM(s) Oral every 24 hours    MEDICATIONS  (PRN):  ALBUTerol/ipratropium for Nebulization 3 milliLiter(s) Nebulizer every 6 hours PRN Shortness of Breath and/or Wheezing  artificial  tears Solution 1 Drop(s) Both EYES every 6 hours PRN Dry Eyes      LABS:                        13.8   6.62  )-----------( 183      ( 28 Aug 2017 06:00 )             43.5     08-28    138  |  96<L>  |  19  ----------------------------<  104<H>  4.5   |  35<H>  |  0.56    Ca    8.5      28 Aug 2017 06:00                MICROBIOLOGY:     RADIOLOGY:  [ ] Reviewed and interpreted by me    PULMONARY FUNCTION TESTS: CHIEF COMPLAINT: shortness of breath, ?pneumonia.     HPI: Ms. 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 s/p resection, HTN, presents here for a fever for 1-2 days.     Patient has been dx with sarcoid decades ago with a biopsy?. She was initially on prednisone but then was tapered and seems like self discontinued 15-20 years ago. Then one year ago, she was placed on home O2 2 L. At baseline, she has very low exercise tolerance, gets dyspneic with just ambulation around the house or 1 block. She lives alone is functional. She was started on prednisone again 1-2 months ago and now tapered to 5 mg daily. Also takes duonebs x1 daily.     Follows up with a private pulmonologist Dr. Jeong pul- [phone]- pt w/ restrictive lung dz, FEV1 0.51 36%.     Now, she comes in with chills and rigors, found to have fevers. She received a CT thorax, which showed significant distortion of her lung parenchymal and possible small new LLL and RLL consolidations. Currently she is on levofloxacin. Patient feels better as her fevers have improved.    She is very hesitant to increase her prednisone.      PAST MEDICAL & SURGICAL HISTORY:  Leg ulcer: History of multiple slow-healing leg ulcers, all healed as of 2016  Osteoporosis: multiple fractures  Hernia  Shingles:   Sarcoidosis: pulmonary  Hypertension  COPD (chronic obstructive pulmonary disease)  Hyperlipemia  History of left shoulder fracture: , fell down stairs, also fracture of pelvis  Shoulder disorder: fell shoulder surgery left   Delivery with history of   History of cataract surgery: right eye   S/P hip replacement: left   Inguinal hernia  History of colon resection: , cancer never confirmed per patient      FAMILY HISTORY:  No significant family history      SOCIAL HISTORY:  Smoking: never  EtOH Use: none  Advance Directives:    Allergies  No Known Allergies    HOME MEDICATIONS:      REVIEW OF SYSTEMS:  Constitutional: see HPI  Eyes: neg  ENT: neg  CV: see HPI  Resp: see HPI  GI: neg  : neg  MSK: neg  Integumentary:  see HPI  Neurological:  neg  Psychiatric: neg  Endocrine: neg  Hematologic/Lymphatic: neg  Allergic/Immunologic: neg  [x] All other systems negative    OBJECTIVE:  ICU Vital Signs Last 24 Hrs  T(C): 37.1 (28 Aug 2017 05:14), Max: 37.2 (27 Aug 2017 22:00)  T(F): 98.7 (28 Aug 2017 05:14), Max: 99 (27 Aug 2017 22:00)  HR: 84 (28 Aug 2017 07:29) (77 - 90)  BP: 124/62 (28 Aug 2017 07:44) (124/62 - 164/78)  RR: 18 (28 Aug 2017 05:14) (18 - 18)  SpO2: 100% (28 Aug 2017 05:14) (99% - 100%)    CAPILLARY BLOOD GLUCOSE      PHYSICAL EXAM:  General: NAD. sitting up comfortably in a chair. On 2.5 L of NC.   HEENT: clear OP.  Lymph Nodes: no significant cervical LN.   Respiratory: mild bibasilar inspiratory crackles. no significant wheezing.  Cardiovascular: nl rate and reg rhythm. nl s1, loud p2.   Abdomen: soft. nt. nd.   Extremities: no edema.  Skin: +skin ecchymoses throughout, foot ulcers.   Neurological: AOx3.    HOSPITAL MEDICATIONS:  MEDICATIONS  (STANDING):  enoxaparin Injectable 30 milliGRAM(s) SubCutaneous every 24 hours  predniSONE   Tablet 5 milliGRAM(s) Oral daily  enalapril 5 milliGRAM(s) Oral daily  simvastatin 10 milliGRAM(s) Oral at bedtime  ALPRAZolam 0.25 milliGRAM(s) Oral at bedtime  montelukast 10 milliGRAM(s) Oral daily  multivitamin 1 Tablet(s) Oral daily  ergocalciferol 37611 Unit(s) Oral <User Schedule>  levoFLOXacin  Tablet 500 milliGRAM(s) Oral every 24 hours    MEDICATIONS  (PRN):  ALBUTerol/ipratropium for Nebulization 3 milliLiter(s) Nebulizer every 6 hours PRN Shortness of Breath and/or Wheezing  artificial  tears Solution 1 Drop(s) Both EYES every 6 hours PRN Dry Eyes      LABS:                        13.8   6.62  )-----------( 183      ( 28 Aug 2017 06:00 )             43.5     08-    138  |  96<L>  |  19  ----------------------------<  104<H>  4.5   |  35<H>  |  0.56    Ca    8.5      28 Aug 2017 06:00      MICROBIOLOGY:  RVP: negative     RADIOLOGY:  CT thorax: New small airspace opacity within the left lower lobe concerning for infection. Worsening airspace opacity within the right lower lobe with associated cystic component, etiology unclear. Short-term follow-up is recommended.      PULMONARY FUNCTION TESTS:  restrictive lung dz, FEV1 0.51 36%. CHIEF COMPLAINT: shortness of breath, ?pneumonia.     HPI: Ms. 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 s/p resection, HTN, presents here for a fever for 1-2 days.     Patient has been dx with sarcoid decades ago with a biopsy?. She was initially on prednisone but then was tapered and seems like self discontinued 15-20 years ago. Then one year ago, she was placed on home O2 2 L. At baseline, she has very low exercise tolerance, gets dyspneic with just ambulation around the house or 1 block. She lives alone is functional. She was started on prednisone again 1-2 months ago and now tapered to 5 mg daily. Also takes duonebs x1 daily.     Follows up with a private pulmonologist Dr. Jeong pul- [phone]- pt w/ restrictive lung dz, FEV1 0.51 36%.     Now, she comes in with chills and rigors, found to have fevers. She chronically has a mild cough, but slight increased in her sputum. She received a CT thorax, which showed significant distortion of her lung parenchymal and possible small new LLL and RLL consolidations. Currently she is on levofloxacin. Patient feels better as her fevers have improved.    She is very hesitant to increase her prednisone.      PAST MEDICAL & SURGICAL HISTORY:  Leg ulcer: History of multiple slow-healing leg ulcers, all healed as of 2016  Osteoporosis: multiple fractures  Hernia  Shingles:   Sarcoidosis: pulmonary  Hypertension  COPD (chronic obstructive pulmonary disease)  Hyperlipemia  History of left shoulder fracture: , fell down stairs, also fracture of pelvis  Shoulder disorder: fell shoulder surgery left   Delivery with history of   History of cataract surgery: right eye 2012  S/P hip replacement: left   Inguinal hernia  History of colon resection: , cancer never confirmed per patient      FAMILY HISTORY:  No significant family history      SOCIAL HISTORY:  Smoking: never  EtOH Use: none  Advance Directives:    Allergies  No Known Allergies    HOME MEDICATIONS:      REVIEW OF SYSTEMS:  Constitutional: see HPI  Eyes: neg  ENT: neg  CV: see HPI  Resp: see HPI  GI: neg  : neg  MSK: neg  Integumentary:  see HPI  Neurological:  neg  Psychiatric: neg  Endocrine: neg  Hematologic/Lymphatic: neg  Allergic/Immunologic: neg  [x] All other systems negative    OBJECTIVE:  ICU Vital Signs Last 24 Hrs  T(C): 37.1 (28 Aug 2017 05:14), Max: 37.2 (27 Aug 2017 22:00)  T(F): 98.7 (28 Aug 2017 05:14), Max: 99 (27 Aug 2017 22:00)  HR: 84 (28 Aug 2017 07:29) (77 - 90)  BP: 124/62 (28 Aug 2017 07:44) (124/62 - 164/78)  RR: 18 (28 Aug 2017 05:14) (18 - 18)  SpO2: 100% (28 Aug 2017 05:14) (99% - 100%)    CAPILLARY BLOOD GLUCOSE      PHYSICAL EXAM:  General: NAD. sitting up comfortably in a chair. On 2.5 L of NC.   HEENT: clear OP.  Lymph Nodes: no significant cervical LN.   Respiratory: mild bibasilar inspiratory crackles. no significant wheezing.  Cardiovascular: nl rate and reg rhythm. nl s1, loud p2.   Abdomen: soft. nt. nd.   Extremities: no edema.  Skin: +skin ecchymoses throughout, foot ulcers.   Neurological: AOx3.    HOSPITAL MEDICATIONS:  MEDICATIONS  (STANDING):  enoxaparin Injectable 30 milliGRAM(s) SubCutaneous every 24 hours  predniSONE   Tablet 5 milliGRAM(s) Oral daily  enalapril 5 milliGRAM(s) Oral daily  simvastatin 10 milliGRAM(s) Oral at bedtime  ALPRAZolam 0.25 milliGRAM(s) Oral at bedtime  montelukast 10 milliGRAM(s) Oral daily  multivitamin 1 Tablet(s) Oral daily  ergocalciferol 14374 Unit(s) Oral <User Schedule>  levoFLOXacin  Tablet 500 milliGRAM(s) Oral every 24 hours    MEDICATIONS  (PRN):  ALBUTerol/ipratropium for Nebulization 3 milliLiter(s) Nebulizer every 6 hours PRN Shortness of Breath and/or Wheezing  artificial  tears Solution 1 Drop(s) Both EYES every 6 hours PRN Dry Eyes      LABS:                        13.8   6.62  )-----------( 183      ( 28 Aug 2017 06:00 )             43.5     08    138  |  96<L>  |  19  ----------------------------<  104<H>  4.5   |  35<H>  |  0.56    Ca    8.5      28 Aug 2017 06:00      MICROBIOLOGY:  RVP: negative     RADIOLOGY:  CT thorax: New small airspace opacity within the left lower lobe concerning for infection. Worsening airspace opacity within the right lower lobe with associated cystic component, etiology unclear. Short-term follow-up is recommended.      PULMONARY FUNCTION TESTS:  restrictive lung dz, FEV1 0.51 36%.

## 2017-08-28 NOTE — CONSULT NOTE ADULT - ATTENDING COMMENTS
Pt seen and examined with fellow, agree with above A+P. Acute on chronic resp failure with hypoxia 2/2 advanced sarcoidosis and likely pulm hypertension, now with acute bacterial pneumonia. Clinically improving on Levaquin, would suggest completing a 7 day course. Cont home dose of steroids.

## 2017-08-28 NOTE — PROGRESS NOTE ADULT - PROBLEM SELECTOR PLAN 7
No IVF  Replete electrolytes as needed  Regular Diet   DISPO Pending clinical status and PT evaluation for weakness

## 2017-08-29 PROCEDURE — 99233 SBSQ HOSP IP/OBS HIGH 50: CPT

## 2017-08-29 PROCEDURE — 99223 1ST HOSP IP/OBS HIGH 75: CPT | Mod: GC

## 2017-08-29 RX ORDER — IPRATROPIUM/ALBUTEROL SULFATE 18-103MCG
3 AEROSOL WITH ADAPTER (GRAM) INHALATION
Qty: 0 | Refills: 0 | COMMUNITY
Start: 2017-08-29

## 2017-08-29 RX ORDER — SENNA PLUS 8.6 MG/1
2 TABLET ORAL
Qty: 0 | Refills: 0 | COMMUNITY
Start: 2017-08-29

## 2017-08-29 RX ORDER — POLYETHYLENE GLYCOL 3350 17 G/17G
17 POWDER, FOR SOLUTION ORAL
Qty: 0 | Refills: 0 | COMMUNITY
Start: 2017-08-29

## 2017-08-29 RX ORDER — DOCUSATE SODIUM 100 MG
1 CAPSULE ORAL
Qty: 0 | Refills: 0 | COMMUNITY
Start: 2017-08-29

## 2017-08-29 RX ADMIN — Medication 1 TABLET(S): at 12:21

## 2017-08-29 RX ADMIN — Medication 100 MILLIGRAM(S): at 06:18

## 2017-08-29 RX ADMIN — Medication 5 MILLIGRAM(S): at 06:18

## 2017-08-29 RX ADMIN — SIMVASTATIN 10 MILLIGRAM(S): 20 TABLET, FILM COATED ORAL at 22:32

## 2017-08-29 RX ADMIN — Medication 100 MILLIGRAM(S): at 22:32

## 2017-08-29 RX ADMIN — MONTELUKAST 10 MILLIGRAM(S): 4 TABLET, CHEWABLE ORAL at 12:21

## 2017-08-29 RX ADMIN — Medication 3 MILLILITER(S): at 09:49

## 2017-08-29 RX ADMIN — Medication 0.25 MILLIGRAM(S): at 22:32

## 2017-08-29 NOTE — PROGRESS NOTE ADULT - PROBLEM SELECTOR PLAN 6
Lovenox 30 mg SQ for DVT ppx

## 2017-08-30 VITALS
DIASTOLIC BLOOD PRESSURE: 58 MMHG | OXYGEN SATURATION: 99 % | RESPIRATION RATE: 20 BRPM | SYSTOLIC BLOOD PRESSURE: 108 MMHG | TEMPERATURE: 99 F | HEART RATE: 85 BPM

## 2017-08-30 LAB
BACTERIA BLD CULT: SIGNIFICANT CHANGE UP
BACTERIA BLD CULT: SIGNIFICANT CHANGE UP

## 2017-08-30 PROCEDURE — 99233 SBSQ HOSP IP/OBS HIGH 50: CPT

## 2017-08-30 RX ORDER — ACETAMINOPHEN 500 MG
650 TABLET ORAL ONCE
Qty: 0 | Refills: 0 | Status: COMPLETED | OUTPATIENT
Start: 2017-08-30 | End: 2017-08-30

## 2017-08-30 RX ADMIN — Medication 650 MILLIGRAM(S): at 08:04

## 2017-08-30 RX ADMIN — MONTELUKAST 10 MILLIGRAM(S): 4 TABLET, CHEWABLE ORAL at 12:25

## 2017-08-30 RX ADMIN — Medication 3 MILLILITER(S): at 10:10

## 2017-08-30 RX ADMIN — Medication 650 MILLIGRAM(S): at 09:04

## 2017-08-30 RX ADMIN — Medication 1 TABLET(S): at 12:25

## 2017-08-30 RX ADMIN — Medication 5 MILLIGRAM(S): at 05:23

## 2017-08-30 NOTE — PROGRESS NOTE ADULT - PROBLEM SELECTOR PLAN 4
c/w home Prednisone PO 5 mg qD.
c/w home Prednisone PO 5 mg qD.
c/w home Prednisone PO 5 mg qD. Given underlying restrictive lung disease low threshold for treating pulmonary infections. levaquin for now to complete 5 days of treatment
c/w home Prednisone PO 5 mg qD. Given underlying lung disease low threshold for treating pulmonary infections. c/w Azithromycin and Ceftriaxone
c/w home Prednisone PO 5 mg qD. Given underlying lung disease low threshold for treating pulmonary infections. c/w Azithromycin and Ceftriaxone

## 2017-08-30 NOTE — PROGRESS NOTE ADULT - PROBLEM SELECTOR PLAN 2
Patient with hx of asthma, continue with DuoNebs q6h.
Patient with hx of asthma, continue with DuoNebs q4h.
Patient with hx of asthma, continue with DuoNebs q4h.

## 2017-08-30 NOTE — PROVIDER CONTACT NOTE (OTHER) - REASON
BP
Pt c/o nausea
follow up BP
Patient without IV access and has IV antibiotic due. Pt refusing placement of a new IV
Pt c/o of headache 5/10 and nausea. Manual /70

## 2017-08-30 NOTE — PROGRESS NOTE ADULT - PROBLEM SELECTOR PLAN 3
c/w Enalapril 5 mg PO qD. BP satisfactory, c/w lipitor 10 mg PO qD for cholesterol

## 2017-08-30 NOTE — PROGRESS NOTE ADULT - ASSESSMENT
89F hx of Asthma, Sarcoidosis, Colon CA s/p resection, HTN, HLD presenting with fevers, rigors, and worsened dyspnea x 1 day-admitted for superimposed community acquired pneumonia.
89F hx of Asthma, Sarcoidosis, Colon CA s/p resection, HTN, HLD presenting with fevers, rigors, and worsened dyspnea x 1 day-admitted for superimposed community acquired pneumonia. Pt now deconditioned- would rather go to rehab
89F hx of Asthma, Sarcoidosis, Colon CA s/p resection, HTN, HLD presenting with fevers, rigors, and worsened dyspnea x 1 day-admitted for superimposed community acquired pneumonia. Pt now deconditioned- would rather go to rehab- for d/c to rehab today- time 40 min- pt lacks social support- is overwhelmed about what will happen when she goes home
89F hx of Asthma, Sarcoidosis, Colon CA s/p resection, HTN, HLD presenting with fevers, rigors, and worsened dyspnea x 1 day. Given chronic steroids, fever, and leukocytosis, suspicion for superimposed community acquired pneumonia.
89F hx of Asthma, Sarcoidosis, Colon CA s/p resection, HTN, HLD presenting with fevers, rigors, and worsened dyspnea x 1 day. Given chronic steroids, fever, and leukocytosis, suspicion for superimposed community acquired pneumonia.

## 2017-08-30 NOTE — PROGRESS NOTE ADULT - PROBLEM SELECTOR PLAN 5
As per patient, is seeing a dermatologist for evaluation and possible biopsy. At first glance appears like an actinic keratosis, patient has a follow up appointment with her Dermatologist in September.
As per patient, is seeing a dermatologist for evaluation and possible biopsy. At first glance appears like an actinic keratosis, patient has a follow up appointment with her Dermatologist in September.
Lovenox 30 mg SQ for DVT ppx
As per patient, is seeing a dermatologist for evaluation and possible biopsy. At first glance appears like an actinic keratosis, patient has a follow up appointment with her Dermatologist in September.
As per patient, is seeing a dermatologist for evaluation and possible biopsy. At first glance appears like an actinic keratosis, patient has a follow up appointment with her Dermatologist in September.

## 2017-08-30 NOTE — PROGRESS NOTE ADULT - PROBLEM SELECTOR PLAN 1
Negative RVP, (+) procalcitonin, Blood cultures are 24h negative, and CT Chest has evidence of pneumonia. Will treat with levaquin for 2 more days. Pulmonary called as pt not agreeable to increasing dose of steroids.  Monitor respiratory status, OOB to chair, incentive spirometry
sepsis resolved.    completing course of levaquin for total of 5 days for CAP.    Pulmonary did not advise higher dose of steroids.  Monitor respiratory status, OOB to chair, incentive spirometry
sepsis resolved.    completing course of levaquin for total of 7 days for CAP.    Pulmonary did not advise higher dose of steroids.  Monitor respiratory status, OOB to chair, incentive spirometry
Patient with no fever since at home s/p Ceftriaxone and Azithromycin. Leukocytosis and fevers and rigors improved. Negative RVP, (+) procalcitonin, Blood cultures are 24h negative, and CT Chest has evidence of pneumonia. Will treat with Augmentin/Azithromycin for now. Monitor respiratory status, OOB to chair, incentive spirometry, CBC daily and vitals q4h.
Patient with no fever since at home s/p Ceftriaxone and Azithromycin. Leukocytosis and fevers and rigors improved. Negative RVP - and CT Chest has some evidence of air-space disease. Will treat with Ceftriaxone Azithromycin for now and await Blood Cultures and Procalcitonin. Monitor respiratory status, OOB to chair, CBC daily and vitals q4h.

## 2017-08-30 NOTE — PROGRESS NOTE ADULT - SUBJECTIVE AND OBJECTIVE BOX
INTERNAL MEDICINE ADS ATTENDING - PROGRESS NOTE    S: Last night felt she had chills and "shakes" in the setting of a fever of 100.4 at home. Now not feeling fevers or chills and less dyspnea, although her baseline does consist of dyspnea.     REVIEW OF SYSTEMS:    CONSTITUTIONAL: No weakness, fevers or chills  EYES/ENT: No visual changes;  No vertigo or throat pain   NECK: No pain or stiffness  RESPIRATORY: Dyspnea worse than her baseline   CARDIOVASCULAR: No chest pain or palpitations  GASTROINTESTINAL: No abdominal or epigastric pain. No nausea, vomiting, or hematemesis; No diarrhea or constipation. No melena or hematochezia.  GENITOURINARY: No dysuria, frequency or hematuria  NEUROLOGICAL: No numbness or weakness  SKIN: No itching, burning, rashes, or lesions   All other review of systems is negative unless indicated above.      enoxaparin Injectable 30 milliGRAM(s) SubCutaneous every 24 hours  predniSONE   Tablet 5 milliGRAM(s) Oral daily  enalapril 5 milliGRAM(s) Oral daily  simvastatin 10 milliGRAM(s) Oral at bedtime  ALPRAZolam 0.25 milliGRAM(s) Oral at bedtime  ALBUTerol/ipratropium for Nebulization 3 milliLiter(s) Nebulizer every 6 hours PRN  montelukast 10 milliGRAM(s) Oral daily  multivitamin 1 Tablet(s) Oral daily  ergocalciferol 78860 Unit(s) Oral <User Schedule>      O:  T(C): 36.4 (08-26-17 @ 05:51), Max: 39.6 (08-25-17 @ 12:47)  HR: 74 (08-26-17 @ 05:51) (60 - 103)  BP: 116/75 (08-26-17 @ 05:51) (114/52 - 180/100)  RR: 18 (08-26-17 @ 05:51) (18 - 31)  SpO2: 100% (08-26-17 @ 05:51) (97% - 100%)    08-25-17 @ 07:01  -  08-26-17 @ 07:00  --------------------------------------------------------  IN: 250 mL / OUT: 0 mL / NET: 250 mL      General: NAD, non-toxic appearing, elderly, well kept, speaking in full sentences   HEENT: EOMI, PERRLA, no conjunctival pallor, MMM, no JVD, no thyromegaly, neck supple, trachea midline  CV: S1S2 RRR no MRG  Lungs: CTA BL  Abdomen: soft NTND +BS   Extremeties: No CCE +WWP  Skin/MSK: No rashes, preserved ROM on active & passive movement, RLE hyperkeratotic lesion with eschar at base, and lateral malleoli erythema neither lesion is painful, b/l UE echymoses   Neuro: AAOx3, no focal deficits (5/5 strength all extremities) no sensory deficits                           13.4   8.81  )-----------( 166      ( 26 Aug 2017 06:15 )             41.0     08-26    134<L>  |  94<L>  |  12  ----------------------------<  91  4.2   |  29  |  0.52    Ca    8.4      26 Aug 2017 06:15    TPro  7.0  /  Alb  3.8  /  TBili  0.8  /  DBili  x   /  AST  50<H>  /  ALT  26  /  AlkPhos  64  08-25    CT Chest - no consolidations, b/l air space disease with centrilobular abnormalities - awaiting official radiology read
INTERNAL MEDICINE ADS ATTENDING - PROGRESS NOTE    S: Patient not having fevers/chills. However she feels more "weak" than her baseline. This concerns her because she lives on her own.     REVIEW OF SYSTEMS:    CONSTITUTIONAL: (+) Weakness, No fevers or chills  EYES/ENT: No visual changes;  No vertigo or throat pain   NECK: No pain or stiffness  RESPIRATORY: No cough, wheezing, hemoptysis; (+) chronic shortness of breath that is slightly worse than usual   CARDIOVASCULAR: No chest pain or palpitations  GASTROINTESTINAL: No abdominal or epigastric pain. No nausea, vomiting, or hematemesis; No diarrhea or constipation. No melena or hematochezia.  GENITOURINARY: No dysuria, frequency or hematuria  NEUROLOGICAL: No numbness or weakness  SKIN: No itching, burning, rashes, or lesions   All other review of systems is negative unless indicated above.      enoxaparin Injectable 30 milliGRAM(s) SubCutaneous every 24 hours  predniSONE   Tablet 5 milliGRAM(s) Oral daily  enalapril 5 milliGRAM(s) Oral daily  simvastatin 10 milliGRAM(s) Oral at bedtime  ALPRAZolam 0.25 milliGRAM(s) Oral at bedtime  ALBUTerol/ipratropium for Nebulization 3 milliLiter(s) Nebulizer every 6 hours PRN  montelukast 10 milliGRAM(s) Oral daily  multivitamin 1 Tablet(s) Oral daily  ergocalciferol 51547 Unit(s) Oral <User Schedule>  azithromycin   Tablet 250 milliGRAM(s) Oral daily  amoxicillin  500 milliGRAM(s)/clavulanate 1 Tablet(s) Oral two times a day      O:  T(C): 37.1 (08-27-17 @ 06:09), Max: 37.1 (08-27-17 @ 06:09)  HR: 72 (08-27-17 @ 06:09) (72 - 92)  BP: 154/82 (08-27-17 @ 06:09) (114/61 - 154/82)  RR: 18 (08-27-17 @ 06:09) (18 - 18)  SpO2: 100% (08-27-17 @ 06:09) (99% - 100%)    General: NAD, non-toxic appearing, elderly, well kept, speaking in full sentences with nasal cannula  HEENT: EOMI, PERRLA, no conjunctival pallor, MMM, no JVD, no thyromegaly, neck supple, trachea midline  CV: S1S2 RRR no MRG  Lungs: CTA BL  Abdomen: soft NTND +BS   Extremeties: No CCE +WWP  Skin/MSK: No rashes, preserved ROM on active & passive movement, RLE hyperkeratotic lesion with eschar at base, and lateral malleoli erythema neither lesion is painful, b/l UE echymoses   Neuro: AAOx3, no focal deficits (5/5 strength all extremities) no sensory deficits                           13.4   8.81  )-----------( 166      ( 26 Aug 2017 06:15 )             41.0     08-26    134<L>  |  94<L>  |  12  ----------------------------<  91  4.2   |  29  |  0.52    Ca    8.4      26 Aug 2017 06:15    TPro  7.0  /  Alb  3.8  /  TBili  0.8  /  DBili  x   /  AST  50<H>  /  ALT  26  /  AlkPhos  64  08-25    Procalcitonin, Serum (08.26.17 @ 10:55)      Procalcitonin, Serum: 0.62      < from: CT Chest No Cont (08.25.17 @ 22:00) >  IMPRESSION:     New small airspace opacity within the left lower lobe concerning for   infection. Worsening airspace opacity within the right lower lobe with   associated cystic component, etiology unclear. Short-term follow-up is   recommended.    JADA WALSH M.D., RADIOLOGY RESIDENT  This document has been electronically signed.  JENNIFER MARSHALL M.D., ATTENDING RADIOLOGIST  This document has been electronically signed. Aug 26 2017 10:52AM        < end of copied text >
Patient is a 89y old  Female who presents with a chief complaint of SOB (27 Aug 2017 13:55)      SUBJECTIVE / OVERNIGHT EVENTS: felt weak today when seen at 12p.  less sob just worried about her lungs    MEDICATIONS  (STANDING):  enoxaparin Injectable 30 milliGRAM(s) SubCutaneous every 24 hours  predniSONE   Tablet 5 milliGRAM(s) Oral daily  enalapril 5 milliGRAM(s) Oral daily  simvastatin 10 milliGRAM(s) Oral at bedtime  ALPRAZolam 0.25 milliGRAM(s) Oral at bedtime  montelukast 10 milliGRAM(s) Oral daily  multivitamin 1 Tablet(s) Oral daily  ergocalciferol 61606 Unit(s) Oral <User Schedule>  docusate sodium 100 milliGRAM(s) Oral three times a day  senna 2 Tablet(s) Oral at bedtime  polyethylene glycol 3350 17 Gram(s) Oral daily    MEDICATIONS  (PRN):  ALBUTerol/ipratropium for Nebulization 3 milliLiter(s) Nebulizer every 6 hours PRN Shortness of Breath and/or Wheezing  artificial  tears Solution 1 Drop(s) Both EYES every 6 hours PRN Dry Eyes      Meds ordered within last 24hours  docusate sodium: [Ordered as COLACE]  100 milliGRAM(s), Oral, three times a day  Provider's Contact #: [phone] (08-28 @ 18:01)  senna:   2 Tablet(s), Oral, at bedtime  Provider's Contact #: [phone] (08-28 @ 18:01)  polyethylene glycol 3350: [Ordered as MIRALAX]  17 Gram(s), Oral, daily  Administration Instructions: Dissolve in 8 ounces water, juice, cola or tea.  Provider's Contact #: [phone] (08-28 @ 18:01)      T(C): 36.8 (08-29-17 @ 15:07), Max: 36.8 (08-28-17 @ 20:45)  HR: 81 (08-29-17 @ 15:07) (71 - 86)  BP: 94/56 (08-29-17 @ 15:07) (94/56 - 152/84)  RR: 20 (08-29-17 @ 15:07) (18 - 20)  SpO2: 100% (08-29-17 @ 15:07) (96% - 100%)    CAPILLARY BLOOD GLUCOSE        I&O's Summary      PHYSICAL EXAM:  GENERAL: NAD  CHEST/LUNG: Clear to auscultation bilaterally; No wheeze  HEART: Regular rate and rhythm; No murmurs, rubs, or gallops  ABDOMEN: Soft, Nontender, Nondistended; Bowel sounds present  EXTREMITIES:  No clubbing, cyanosis, or edema      LABS:                        13.8   6.62  )-----------( 183      ( 28 Aug 2017 06:00 )             43.5     08-28    138  |  96<L>  |  19  ----------------------------<  104<H>  4.5   |  35<H>  |  0.56    Ca    8.5      28 Aug 2017 06:00                RADIOLOGY & ADDITIONAL TESTS:    Imaging Personally Reviewed:    Consultant(s) Notes Reviewed:      Care Discussed with Consultants/Other Providers:
Patient is a 89y old  Female who presents with a chief complaint of SOB (27 Aug 2017 13:55)      SUBJECTIVE / OVERNIGHT EVENTS: pt anxious when seen earlier- about going to [place].  feels weak.  not sob.      MEDICATIONS  (STANDING):  enoxaparin Injectable 30 milliGRAM(s) SubCutaneous every 24 hours  predniSONE   Tablet 5 milliGRAM(s) Oral daily  enalapril 5 milliGRAM(s) Oral daily  simvastatin 10 milliGRAM(s) Oral at bedtime  ALPRAZolam 0.25 milliGRAM(s) Oral at bedtime  montelukast 10 milliGRAM(s) Oral daily  multivitamin 1 Tablet(s) Oral daily  ergocalciferol 78379 Unit(s) Oral <User Schedule>  docusate sodium 100 milliGRAM(s) Oral three times a day  senna 2 Tablet(s) Oral at bedtime  polyethylene glycol 3350 17 Gram(s) Oral daily  levoFLOXacin  Tablet 500 milliGRAM(s) Oral every 24 hours    MEDICATIONS  (PRN):  ALBUTerol/ipratropium for Nebulization 3 milliLiter(s) Nebulizer every 6 hours PRN Shortness of Breath and/or Wheezing  artificial  tears Solution 1 Drop(s) Both EYES every 6 hours PRN Dry Eyes      Meds ordered within last 24hours  levoFLOXacin  Tablet: [Ordered as LEVAQUIN  Tablet]  500 milliGRAM(s), Oral, every 24 hours, Stop After 2 Days  Indication: CAP  Administration Instructions: This is a Look-alike/Sound-alike Medication  Provider's Contact #: [phone] (08-29 @ 18:42)  acetaminophen   Tablet.: [Known as TYLENOL.]  650 milliGRAM(s), Oral, once, Stop After 1 Doses  Administration Instructions: MAX DAILY DOSE:  ADULT = 4,000 mG/Day (08-30 @ 07:44)      T(C): 36.5 (08-30-17 @ 05:21), Max: 37.2 (08-29-17 @ 21:38)  HR: 71 (08-30-17 @ 12:30) (71 - 118)  BP: 128/68 (08-30-17 @ 12:30) (94/56 - 180/70)  RR: 20 (08-30-17 @ 05:21) (18 - 20)  SpO2: 97% (08-30-17 @ 10:10) (95% - 100%)    CAPILLARY BLOOD GLUCOSE        I&O's Summary      PHYSICAL EXAM:  GENERAL: NAD  CHEST/LUNG: coarse bs at bases unchanged  HEART: Regular rate and rhythm; No murmurs, rubs, or gallops  ABDOMEN: Soft, Nontender, Nondistended; Bowel sounds present  EXTREMITIES:  No clubbing, cyanosis, or edema      LABS:                    RADIOLOGY & ADDITIONAL TESTS:    Imaging Personally Reviewed:    Consultant(s) Notes Reviewed:      Care Discussed with Consultants/Other Providers:
Patient is a 89y old  Female who presents with a chief complaint of SOB (27 Aug 2017 13:55)      SUBJECTIVE / OVERNIGHT EVENTS: pt w/ SOB on exertion- always has it.  feels a bit winded.  came in w/ chills.  Spoke w/ Dr. Jeong [facility]- [phone]- pt w/ restrictive lung dz, FEV1 0.51 36% has prednisone for chronic dyspnea, oxygen for exertion    MEDICATIONS  (STANDING):  enoxaparin Injectable 30 milliGRAM(s) SubCutaneous every 24 hours  predniSONE   Tablet 5 milliGRAM(s) Oral daily  enalapril 5 milliGRAM(s) Oral daily  simvastatin 10 milliGRAM(s) Oral at bedtime  ALPRAZolam 0.25 milliGRAM(s) Oral at bedtime  montelukast 10 milliGRAM(s) Oral daily  multivitamin 1 Tablet(s) Oral daily  ergocalciferol 60485 Unit(s) Oral <User Schedule>  azithromycin   Tablet 250 milliGRAM(s) Oral daily  cefTRIAXone   IVPB 1 Gram(s) IV Intermittent every 24 hours    MEDICATIONS  (PRN):  ALBUTerol/ipratropium for Nebulization 3 milliLiter(s) Nebulizer every 6 hours PRN Shortness of Breath and/or Wheezing  artificial  tears Solution 1 Drop(s) Both EYES every 6 hours PRN Dry Eyes      Meds ordered within last 24hours  artificial  tears Solution:   1 Drop(s), Both EYES, every 6 hours, PRN for Dry Eyes  Administration Instructions: for ophthalmic use  Provider's Contact #: [phone] (08-27 @ 22:08)  cefTRIAXone   IVPB: [Known as ROCEPHIN  IVPB]  1 Gram(s) in dextrose 5% 50 milliLiter(s), IV Intermittent, every 24 hours, infuse over 30 Minute(s)  Indication: CAP  Administration Instructions: This is a Look-alike/Sound-alike Medication  Provider's Contact #: [phone] (08-28 @ 10:32)      T(C): 37.1 (08-28-17 @ 05:14), Max: 37.2 (08-27-17 @ 22:00)  HR: 84 (08-28-17 @ 07:29) (77 - 90)  BP: 124/62 (08-28-17 @ 07:44) (124/62 - 164/78)  RR: 18 (08-28-17 @ 05:14) (18 - 18)  SpO2: 100% (08-28-17 @ 05:14) (99% - 100%)    CAPILLARY BLOOD GLUCOSE        I&O's Summary      PHYSICAL EXAM:  GENERAL: NAD  CHEST/LUNG: coarse bs at bases  HEART: Regular rate and rhythm; No murmurs, rubs, or gallops  ABDOMEN: Soft, Nontender, Nondistended; Bowel sounds present  EXTREMITIES:  No clubbing, cyanosis, or edema      LABS:                        13.8   6.62  )-----------( 183      ( 28 Aug 2017 06:00 )             43.5     08-28    138  |  96<L>  |  19  ----------------------------<  104<H>  4.5   |  35<H>  |  0.56    Ca    8.5      28 Aug 2017 06:00                RADIOLOGY & ADDITIONAL TESTS:    Imaging Personally Reviewed:    Consultant(s) Notes Reviewed:      Care Discussed with Consultants/Other Providers:

## 2017-10-07 ENCOUNTER — INPATIENT (INPATIENT)
Facility: HOSPITAL | Age: 82
LOS: 2 days | Discharge: HOME HEALTH | End: 2017-10-10
Attending: HOSPITALIST | Admitting: HOSPITALIST
Payer: MEDICARE

## 2017-10-07 VITALS
HEART RATE: 82 BPM | RESPIRATION RATE: 18 BRPM | OXYGEN SATURATION: 96 % | TEMPERATURE: 99 F | SYSTOLIC BLOOD PRESSURE: 185 MMHG | DIASTOLIC BLOOD PRESSURE: 100 MMHG

## 2017-10-07 DIAGNOSIS — Z87.81 PERSONAL HISTORY OF (HEALED) TRAUMATIC FRACTURE: Chronic | ICD-10-CM

## 2017-10-07 RX ORDER — ONDANSETRON 8 MG/1
4 TABLET, FILM COATED ORAL ONCE
Qty: 0 | Refills: 0 | Status: COMPLETED | OUTPATIENT
Start: 2017-10-07 | End: 2017-10-07

## 2017-10-07 RX ORDER — SODIUM CHLORIDE 9 MG/ML
500 INJECTION INTRAMUSCULAR; INTRAVENOUS; SUBCUTANEOUS ONCE
Qty: 0 | Refills: 0 | Status: COMPLETED | OUTPATIENT
Start: 2017-10-07 | End: 2017-10-07

## 2017-10-07 NOTE — ED ADULT NURSE NOTE - ED STAT RN HANDOFF DETAILS
pt. appears in NAD, c/o mild pain on lower back when she moves, pt. currently eating apple sauce. MD from admitting team currently at bedside and aware. pt. with bed. verbal report given to LAZARO Sawant. pt. appears in NAD, c/o mild pain on lower back when she moves, pt. currently eating apple sauce. MD Kirby from admitting team currently at bedside and aware. pt. with bed. verbal report given to LAZARO Sawant.

## 2017-10-07 NOTE — ED PROVIDER NOTE - ATTENDING CONTRIBUTION TO CARE
ILANA Attending Note - Dr. Orantes  89yoF hx of sarcoidosis, copd, htn, hld pw nausea and decreased po in take for 2-3 days. pt says she twisted and hurt her back and started taking Tylenol every 6 hours for the pain and then developed persistent nausea, .  PE: pt is alert and oriented, perrl, ent normal, membranes are dry, neck supple. no lymphadenopathy or thyroid enlargement, No JVD.  Chest poor air movement with wheezing on right., Heart- reg rhythm with grade 2/6 systolic murmur heard best over mitral area radiating towards axilla, rubs or gallops, radial pulses equal bilaterally.  Abd is soft, non-tender, Bowel sounds are active. no mass or organomegaly. : No CVA tenderness. Neuro:  Pt alert and oriented x 3. Perrl    Distal neurosensory is intact. Motor function is 5/5 strength bilaterally.  No focal deficits. Extremities:  No edema.  Skin: warm and dry with poor skin turgor  Impression: Acute gastritis with Dehydration   Plan: IV Hydration, Zofran, labs

## 2017-10-07 NOTE — ED PROVIDER NOTE - MEDICAL DECISION MAKING DETAILS
89yoF hx of sarcoidosis, copd pw persistent nausea with minimal po intake for several days. concern for failure to thrive. minimal help at home with only part time aid. basic labs, ekg, cxr, fluids, meds, admit.

## 2017-10-07 NOTE — ED ADULT TRIAGE NOTE - CHIEF COMPLAINT QUOTE
Pt arrives from home via EMS. Pt A&Ox3. Pt states she was getting ready for bed and had a "wave of nausea" come over her followed by diaphoresis.   Pt on intermittent home O2 2L. Pt denies chest pain or SOB and is now stating "I feel so much better after that stuff they gave me".  EMS: 20G IV in RAC with zofran 4mg IVP en route to ER.

## 2017-10-07 NOTE — ED PROVIDER NOTE - PROGRESS NOTE DETAILS
helena hooks - discussed with hospitalist, will admit to Dr. Byers for failure to thrive and decreased po intake.

## 2017-10-07 NOTE — ED PROVIDER NOTE - OBJECTIVE STATEMENT
89yoF hx of sarcoidosis, copd, htn, hld pw nausea and decrased po in take for 2-3 days. pt says she twisted and hurt her back and started taking tylenol every 6 hours for the pain. then developed nausea, persistent. has only been drinking tea and eating some crackers for several days now. at baseline walks with a walker. 1 week ago was released from [place]. denies fevers, chills, cough, chest pain, abd pain or other issues.

## 2017-10-07 NOTE — ED ADULT NURSE NOTE - OBJECTIVE STATEMENT
Baltazar RN- pt states she threw her back out yesterday at home and took Tylenol on an empty stomach and has been nauseous ever since- pt is awake, alert, vitally stable in NAD, afebrile. pt has hx of HTN, is hypertensive currently- pt denies any chest pain, SOB, n/v/d, dizziness, visual changes, dysuria. pt has IV in right forearm placed by EMS- pt states she got meds from EMS and now she feels better - does not know what meds. handoff report given to RN-whitney

## 2017-10-08 DIAGNOSIS — M54.6 PAIN IN THORACIC SPINE: ICD-10-CM

## 2017-10-08 DIAGNOSIS — E87.1 HYPO-OSMOLALITY AND HYPONATREMIA: ICD-10-CM

## 2017-10-08 DIAGNOSIS — I10 ESSENTIAL (PRIMARY) HYPERTENSION: ICD-10-CM

## 2017-10-08 DIAGNOSIS — D86.9 SARCOIDOSIS, UNSPECIFIED: ICD-10-CM

## 2017-10-08 DIAGNOSIS — R62.7 ADULT FAILURE TO THRIVE: ICD-10-CM

## 2017-10-08 DIAGNOSIS — R11.0 NAUSEA: ICD-10-CM

## 2017-10-08 LAB
ALBUMIN SERPL ELPH-MCNC: 3.4 G/DL — SIGNIFICANT CHANGE UP (ref 3.3–5)
ALP SERPL-CCNC: 69 U/L — SIGNIFICANT CHANGE UP (ref 40–120)
ALT FLD-CCNC: 22 U/L — SIGNIFICANT CHANGE UP (ref 4–33)
APPEARANCE UR: CLEAR — SIGNIFICANT CHANGE UP
AST SERPL-CCNC: 30 U/L — SIGNIFICANT CHANGE UP (ref 4–32)
BASOPHILS # BLD AUTO: 0.01 K/UL — SIGNIFICANT CHANGE UP (ref 0–0.2)
BASOPHILS NFR BLD AUTO: 0.1 % — SIGNIFICANT CHANGE UP (ref 0–2)
BILIRUB SERPL-MCNC: 0.4 MG/DL — SIGNIFICANT CHANGE UP (ref 0.2–1.2)
BILIRUB UR-MCNC: NEGATIVE — SIGNIFICANT CHANGE UP
BLOOD UR QL VISUAL: NEGATIVE — SIGNIFICANT CHANGE UP
BUN SERPL-MCNC: 19 MG/DL — SIGNIFICANT CHANGE UP (ref 7–23)
CALCIUM SERPL-MCNC: 8.5 MG/DL — SIGNIFICANT CHANGE UP (ref 8.4–10.5)
CHLORIDE SERPL-SCNC: 93 MMOL/L — LOW (ref 98–107)
CK MB BLD-MCNC: 2.85 NG/ML — SIGNIFICANT CHANGE UP (ref 1–4.7)
CK MB BLD-MCNC: SIGNIFICANT CHANGE UP (ref 0–2.5)
CK SERPL-CCNC: 50 U/L — SIGNIFICANT CHANGE UP (ref 25–170)
CO2 SERPL-SCNC: 31 MMOL/L — SIGNIFICANT CHANGE UP (ref 22–31)
COLOR SPEC: YELLOW — SIGNIFICANT CHANGE UP
CREAT SERPL-MCNC: 0.56 MG/DL — SIGNIFICANT CHANGE UP (ref 0.5–1.3)
EOSINOPHIL # BLD AUTO: 0.01 K/UL — SIGNIFICANT CHANGE UP (ref 0–0.5)
EOSINOPHIL NFR BLD AUTO: 0.1 % — SIGNIFICANT CHANGE UP (ref 0–6)
GLUCOSE SERPL-MCNC: 139 MG/DL — HIGH (ref 70–99)
GLUCOSE UR-MCNC: NEGATIVE — SIGNIFICANT CHANGE UP
HCT VFR BLD CALC: 38.1 % — SIGNIFICANT CHANGE UP (ref 34.5–45)
HGB BLD-MCNC: 12.6 G/DL — SIGNIFICANT CHANGE UP (ref 11.5–15.5)
HYALINE CASTS # UR AUTO: SIGNIFICANT CHANGE UP (ref 0–?)
IMM GRANULOCYTES # BLD AUTO: 0.02 # — SIGNIFICANT CHANGE UP
IMM GRANULOCYTES NFR BLD AUTO: 0.3 % — SIGNIFICANT CHANGE UP (ref 0–1.5)
KETONES UR-MCNC: NEGATIVE — SIGNIFICANT CHANGE UP
LEUKOCYTE ESTERASE UR-ACNC: NEGATIVE — SIGNIFICANT CHANGE UP
LYMPHOCYTES # BLD AUTO: 0.57 K/UL — LOW (ref 1–3.3)
LYMPHOCYTES # BLD AUTO: 7.4 % — LOW (ref 13–44)
MCHC RBC-ENTMCNC: 31.5 PG — SIGNIFICANT CHANGE UP (ref 27–34)
MCHC RBC-ENTMCNC: 33.1 % — SIGNIFICANT CHANGE UP (ref 32–36)
MCV RBC AUTO: 95.3 FL — SIGNIFICANT CHANGE UP (ref 80–100)
MONOCYTES # BLD AUTO: 0.43 K/UL — SIGNIFICANT CHANGE UP (ref 0–0.9)
MONOCYTES NFR BLD AUTO: 5.6 % — SIGNIFICANT CHANGE UP (ref 2–14)
MUCOUS THREADS # UR AUTO: SIGNIFICANT CHANGE UP
NEUTROPHILS # BLD AUTO: 6.65 K/UL — SIGNIFICANT CHANGE UP (ref 1.8–7.4)
NEUTROPHILS NFR BLD AUTO: 86.5 % — HIGH (ref 43–77)
NITRITE UR-MCNC: NEGATIVE — SIGNIFICANT CHANGE UP
NON-SQ EPI CELLS # UR AUTO: <1 — SIGNIFICANT CHANGE UP
NRBC # FLD: 0 — SIGNIFICANT CHANGE UP
PH UR: 6 — SIGNIFICANT CHANGE UP (ref 4.6–8)
PLATELET # BLD AUTO: 237 K/UL — SIGNIFICANT CHANGE UP (ref 150–400)
PMV BLD: 9.3 FL — SIGNIFICANT CHANGE UP (ref 7–13)
POTASSIUM SERPL-MCNC: 4.4 MMOL/L — SIGNIFICANT CHANGE UP (ref 3.5–5.3)
POTASSIUM SERPL-SCNC: 4.4 MMOL/L — SIGNIFICANT CHANGE UP (ref 3.5–5.3)
PROT SERPL-MCNC: 6.6 G/DL — SIGNIFICANT CHANGE UP (ref 6–8.3)
PROT UR-MCNC: 100 — HIGH
RBC # BLD: 4 M/UL — SIGNIFICANT CHANGE UP (ref 3.8–5.2)
RBC # FLD: 14.1 % — SIGNIFICANT CHANGE UP (ref 10.3–14.5)
RBC CASTS # UR COMP ASSIST: HIGH (ref 0–?)
SODIUM SERPL-SCNC: 132 MMOL/L — LOW (ref 135–145)
SP GR SPEC: 1.02 — SIGNIFICANT CHANGE UP (ref 1–1.03)
SQUAMOUS # UR AUTO: SIGNIFICANT CHANGE UP
TROPONIN T SERPL-MCNC: < 0.06 NG/ML — SIGNIFICANT CHANGE UP (ref 0–0.06)
UROBILINOGEN FLD QL: NORMAL E.U. — SIGNIFICANT CHANGE UP (ref 0.1–0.2)
WBC # BLD: 7.69 K/UL — SIGNIFICANT CHANGE UP (ref 3.8–10.5)
WBC # FLD AUTO: 7.69 K/UL — SIGNIFICANT CHANGE UP (ref 3.8–10.5)
WBC UR QL: SIGNIFICANT CHANGE UP (ref 0–?)

## 2017-10-08 PROCEDURE — 71020: CPT | Mod: 26

## 2017-10-08 PROCEDURE — 99223 1ST HOSP IP/OBS HIGH 75: CPT

## 2017-10-08 RX ORDER — HYDRALAZINE HCL 50 MG
10 TABLET ORAL ONCE
Qty: 0 | Refills: 0 | Status: COMPLETED | OUTPATIENT
Start: 2017-10-08 | End: 2017-10-08

## 2017-10-08 RX ORDER — ONDANSETRON 8 MG/1
4 TABLET, FILM COATED ORAL EVERY 6 HOURS
Qty: 0 | Refills: 0 | Status: DISCONTINUED | OUTPATIENT
Start: 2017-10-08 | End: 2017-10-10

## 2017-10-08 RX ORDER — ERGOCALCIFEROL 1.25 MG/1
50000 CAPSULE ORAL
Qty: 0 | Refills: 0 | Status: DISCONTINUED | OUTPATIENT
Start: 2017-10-08 | End: 2017-10-10

## 2017-10-08 RX ORDER — SIMVASTATIN 20 MG/1
1 TABLET, FILM COATED ORAL
Qty: 0 | Refills: 0 | COMMUNITY

## 2017-10-08 RX ORDER — METOCLOPRAMIDE HCL 10 MG
5 TABLET ORAL ONCE
Qty: 0 | Refills: 0 | Status: COMPLETED | OUTPATIENT
Start: 2017-10-08 | End: 2017-10-08

## 2017-10-08 RX ORDER — ALBUTEROL 90 UG/1
2 AEROSOL, METERED ORAL EVERY 6 HOURS
Qty: 0 | Refills: 0 | Status: DISCONTINUED | OUTPATIENT
Start: 2017-10-08 | End: 2017-10-10

## 2017-10-08 RX ORDER — ACETAMINOPHEN 500 MG
650 TABLET ORAL EVERY 6 HOURS
Qty: 0 | Refills: 0 | Status: DISCONTINUED | OUTPATIENT
Start: 2017-10-08 | End: 2017-10-10

## 2017-10-08 RX ORDER — ALPRAZOLAM 0.25 MG
0.25 TABLET ORAL AT BEDTIME
Qty: 0 | Refills: 0 | Status: DISCONTINUED | OUTPATIENT
Start: 2017-10-08 | End: 2017-10-10

## 2017-10-08 RX ORDER — IPRATROPIUM/ALBUTEROL SULFATE 18-103MCG
3 AEROSOL WITH ADAPTER (GRAM) INHALATION EVERY 6 HOURS
Qty: 0 | Refills: 0 | Status: DISCONTINUED | OUTPATIENT
Start: 2017-10-08 | End: 2017-10-10

## 2017-10-08 RX ORDER — ERGOCALCIFEROL 1.25 MG/1
1 CAPSULE ORAL
Qty: 0 | Refills: 0 | COMMUNITY

## 2017-10-08 RX ORDER — MONTELUKAST 4 MG/1
10 TABLET, CHEWABLE ORAL DAILY
Qty: 0 | Refills: 0 | Status: DISCONTINUED | OUTPATIENT
Start: 2017-10-08 | End: 2017-10-10

## 2017-10-08 RX ORDER — LIDOCAINE 4 G/100G
1 CREAM TOPICAL DAILY
Qty: 0 | Refills: 0 | Status: DISCONTINUED | OUTPATIENT
Start: 2017-10-08 | End: 2017-10-10

## 2017-10-08 RX ORDER — ENOXAPARIN SODIUM 100 MG/ML
30 INJECTION SUBCUTANEOUS EVERY 24 HOURS
Qty: 0 | Refills: 0 | Status: DISCONTINUED | OUTPATIENT
Start: 2017-10-08 | End: 2017-10-10

## 2017-10-08 RX ORDER — DOCUSATE SODIUM 100 MG
100 CAPSULE ORAL
Qty: 0 | Refills: 0 | Status: DISCONTINUED | OUTPATIENT
Start: 2017-10-08 | End: 2017-10-10

## 2017-10-08 RX ORDER — SIMVASTATIN 20 MG/1
10 TABLET, FILM COATED ORAL AT BEDTIME
Qty: 0 | Refills: 0 | Status: DISCONTINUED | OUTPATIENT
Start: 2017-10-08 | End: 2017-10-10

## 2017-10-08 RX ORDER — SENNA PLUS 8.6 MG/1
2 TABLET ORAL AT BEDTIME
Qty: 0 | Refills: 0 | Status: DISCONTINUED | OUTPATIENT
Start: 2017-10-08 | End: 2017-10-10

## 2017-10-08 RX ORDER — MONTELUKAST 4 MG/1
1 TABLET, CHEWABLE ORAL
Qty: 0 | Refills: 0 | COMMUNITY

## 2017-10-08 RX ORDER — ONDANSETRON 8 MG/1
4 TABLET, FILM COATED ORAL ONCE
Qty: 0 | Refills: 0 | Status: COMPLETED | OUTPATIENT
Start: 2017-10-08 | End: 2017-10-08

## 2017-10-08 RX ORDER — POLYETHYLENE GLYCOL 3350 17 G/17G
17 POWDER, FOR SOLUTION ORAL DAILY
Qty: 0 | Refills: 0 | Status: DISCONTINUED | OUTPATIENT
Start: 2017-10-08 | End: 2017-10-10

## 2017-10-08 RX ORDER — ALBUTEROL 90 UG/1
2 AEROSOL, METERED ORAL
Qty: 0 | Refills: 0 | COMMUNITY

## 2017-10-08 RX ADMIN — Medication 3 MILLILITER(S): at 10:48

## 2017-10-08 RX ADMIN — Medication 100 MILLIGRAM(S): at 19:19

## 2017-10-08 RX ADMIN — SODIUM CHLORIDE 500 MILLILITER(S): 9 INJECTION INTRAMUSCULAR; INTRAVENOUS; SUBCUTANEOUS at 00:06

## 2017-10-08 RX ADMIN — Medication 100 MILLIGRAM(S): at 07:27

## 2017-10-08 RX ADMIN — Medication 5 MILLIGRAM(S): at 07:27

## 2017-10-08 RX ADMIN — SENNA PLUS 2 TABLET(S): 8.6 TABLET ORAL at 21:03

## 2017-10-08 RX ADMIN — POLYETHYLENE GLYCOL 3350 17 GRAM(S): 17 POWDER, FOR SOLUTION ORAL at 13:25

## 2017-10-08 RX ADMIN — Medication 5 MILLIGRAM(S): at 07:28

## 2017-10-08 RX ADMIN — SIMVASTATIN 10 MILLIGRAM(S): 20 TABLET, FILM COATED ORAL at 21:03

## 2017-10-08 RX ADMIN — Medication 1 TABLET(S): at 13:25

## 2017-10-08 RX ADMIN — Medication 10 MILLIGRAM(S): at 22:45

## 2017-10-08 RX ADMIN — MONTELUKAST 10 MILLIGRAM(S): 4 TABLET, CHEWABLE ORAL at 13:25

## 2017-10-08 RX ADMIN — Medication 0.25 MILLIGRAM(S): at 23:07

## 2017-10-08 RX ADMIN — ONDANSETRON 4 MILLIGRAM(S): 8 TABLET, FILM COATED ORAL at 00:06

## 2017-10-08 RX ADMIN — ONDANSETRON 4 MILLIGRAM(S): 8 TABLET, FILM COATED ORAL at 03:17

## 2017-10-08 NOTE — H&P ADULT - PROBLEM SELECTOR PLAN 2
Likely musculoskeletal, however, patient is elderly w/ OP and on chronic steroids, will order xray to r/o compression Fx

## 2017-10-08 NOTE — ED ADULT NURSE REASSESSMENT NOTE - NS ED NURSE REASSESS COMMENT FT1
rec'd pt. a&ox3, appears in NAD, with g20 saline lock on rt ac with no ss of infiltration. denies any pain at this time. noted saturating on 87-88% on RA, hooked on 2L O2 via nc, saturation increased to 96-97%. pt. states she uses O2 at home for her sarcoidosis. denies any SOB at this time. pt. feels nauseated, Zofran given as ordered. pt. admitted, awaits bed. will continue to monitor

## 2017-10-08 NOTE — H&P ADULT - HISTORY OF PRESENT ILLNESS
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 Patient was admitted to [facility] last August for CAP and was discharged to [facility] for Rehab. Was discharged from [place] last week. Patient reports straining her back about 5 days ago. She had been doing her laundry and bent over for a prolonged period of time. When she was finished, began to paraspinal back pain in the thoracolumbar region. Patient then started to take Tylenol 500 mg PO Q6H PRN. Initially developed nausea after taking Tylenol on an empty stomach, however, nausea and anorexia persisted throughout the day and persisted for the rest of the week. Patient denies any NSAIDs use. Denies any other changes in her medications, denies any change in her diet, denies any sick contacts. No other known triggers for her nausea. Has only been drinking tea and eating some crackers for the past few days. Denies F/C/Vomiting/abd pain/changes in BM.

## 2017-10-08 NOTE — H&P ADULT - NSHPREVIEWOFSYSTEMS_GEN_ALL_CORE
Constitutional Symptoms: +weakness, fevers, chills, weight loss,   Eyes: No visual changes, headache, eye pain, double vision  Ears, Nose, Mouth, Throat: No runny nose, sinus pain, ear pain, tinnitus, sore throat, dysphagia, odynophagia  Cardiovascular: No chest pain, palpitations, edema  Respiratory: No cough, wheezing, hemoptysis, shortness of breath  Gastrointestinal: No abdominal pain, dysphagia, anorexia, diarrhea/constipation, hematemesis, BRBPR, melaena, +nausea, no vomiting  Genitourinary: No dysuria, frequency, hematuria  Musculoskeletal: No joint pain, joint swelling, decreased ROM. +thoracolumbar paraspinal pain  Skin: No pruritus, rashes, lesions, wounds  Neurologic:  No seizures, headache, paraesthesiae, numbness, limb weakness  Psychiatric: No depression, anxiety, difficulty concentrating, anhedonia, lack of energy

## 2017-10-08 NOTE — H&P ADULT - NSHPPHYSICALEXAM_GEN_ALL_CORE
T(C): 37 (10-08-17 @ 04:39), Max: 37.1 (10-07-17 @ 22:22)  HR: 84 (10-08-17 @ 04:39) (71 - 88)  BP: 152/92 (10-08-17 @ 04:39) (128/83 - 189/101)  RR: 17 (10-08-17 @ 04:39) (16 - 18)  SpO2: 98% (10-08-17 @ 04:39) (96% - 100%)    Constitutional: NAD, thin  Ears, Nose, Mouth, and Throat: normal external ears and nose, normal hearing, moist oral mucosa  Eyes: normal conjunctiva, EOMI, PERRLA  Neck: supple, no JVD  Respiratory: Clear to auscultation bilaterally. No wheezes, rales or rhonchi. Normal respiratory effort  Cardiovascular: RRR, no M/R/G, no edema, 2+ Peripheral Pulses  Gastrointestinal: soft, nontender, nondistended, +BS, no hernia  Skin: warm, dry, no rash  Neurologic: sensation grossly intact, CN grossly intact, non-focal exam  Musculoskeletal: no clubbing, no cyanosis, no joint swelling, declined back exam 2/2 pain  Psychiatric: AOX3, normal mood, affect

## 2017-10-08 NOTE — H&P ADULT - NSHPLABSRESULTS_GEN_ALL_CORE
10-07    132<L>  |  93<L>  |  19  ----------------------------<  139<H>  4.4   |  31  |  0.56    Ca    8.5      07 Oct 2017 23:55    TPro  6.6  /  Alb  3.4  /  TBili  0.4  /  DBili  x   /  AST  30  /  ALT  22  /  AlkPhos  69  10-07                            12.6   7.69  )-----------( 237      ( 07 Oct 2017 23:55 )             38.1       CARDIAC MARKERS ( 07 Oct 2017 23:55 )  x     / < 0.06 ng/mL / 50 u/L / 2.85 ng/mL / x            LIVER FUNCTIONS - ( 07 Oct 2017 23:55 )  Alb: 3.4 g/dL / Pro: 6.6 g/dL / ALK PHOS: 69 u/L / ALT: 22 u/L / AST: 30 u/L / GGT: x             CXR personally reviewed, no acute findings  EKG personally reviewed, NSR, no acute findings, no sig change from prev EKG

## 2017-10-08 NOTE — H&P ADULT - ASSESSMENT
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

## 2017-10-09 ENCOUNTER — TRANSCRIPTION ENCOUNTER (OUTPATIENT)
Age: 82
End: 2017-10-09

## 2017-10-09 LAB
BUN SERPL-MCNC: 17 MG/DL — SIGNIFICANT CHANGE UP (ref 7–23)
CALCIUM SERPL-MCNC: 8.4 MG/DL — SIGNIFICANT CHANGE UP (ref 8.4–10.5)
CHLORIDE SERPL-SCNC: 94 MMOL/L — LOW (ref 98–107)
CO2 SERPL-SCNC: 30 MMOL/L — SIGNIFICANT CHANGE UP (ref 22–31)
CREAT SERPL-MCNC: 0.52 MG/DL — SIGNIFICANT CHANGE UP (ref 0.5–1.3)
GLUCOSE SERPL-MCNC: 81 MG/DL — SIGNIFICANT CHANGE UP (ref 70–99)
MAGNESIUM SERPL-MCNC: 1.7 MG/DL — SIGNIFICANT CHANGE UP (ref 1.6–2.6)
PHOSPHATE SERPL-MCNC: 3 MG/DL — SIGNIFICANT CHANGE UP (ref 2.5–4.5)
POTASSIUM SERPL-MCNC: 4.5 MMOL/L — SIGNIFICANT CHANGE UP (ref 3.5–5.3)
POTASSIUM SERPL-SCNC: 4.5 MMOL/L — SIGNIFICANT CHANGE UP (ref 3.5–5.3)
SODIUM SERPL-SCNC: 134 MMOL/L — LOW (ref 135–145)

## 2017-10-09 PROCEDURE — 99233 SBSQ HOSP IP/OBS HIGH 50: CPT

## 2017-10-09 PROCEDURE — 72082 X-RAY EXAM ENTIRE SPI 2/3 VW: CPT | Mod: 26

## 2017-10-09 RX ORDER — CHOLECALCIFEROL (VITAMIN D3) 125 MCG
1000 CAPSULE ORAL DAILY
Qty: 0 | Refills: 0 | Status: DISCONTINUED | OUTPATIENT
Start: 2017-10-09 | End: 2017-10-10

## 2017-10-09 RX ORDER — ACETAMINOPHEN 500 MG
2 TABLET ORAL
Qty: 0 | Refills: 0 | COMMUNITY
Start: 2017-10-09

## 2017-10-09 RX ADMIN — Medication 650 MILLIGRAM(S): at 13:30

## 2017-10-09 RX ADMIN — Medication 650 MILLIGRAM(S): at 03:30

## 2017-10-09 RX ADMIN — Medication 0.25 MILLIGRAM(S): at 23:05

## 2017-10-09 RX ADMIN — Medication 650 MILLIGRAM(S): at 20:20

## 2017-10-09 RX ADMIN — Medication 5 MILLIGRAM(S): at 06:12

## 2017-10-09 RX ADMIN — Medication 100 MILLIGRAM(S): at 17:44

## 2017-10-09 RX ADMIN — Medication 3 MILLILITER(S): at 11:14

## 2017-10-09 RX ADMIN — SENNA PLUS 2 TABLET(S): 8.6 TABLET ORAL at 21:59

## 2017-10-09 RX ADMIN — SIMVASTATIN 10 MILLIGRAM(S): 20 TABLET, FILM COATED ORAL at 21:59

## 2017-10-09 RX ADMIN — Medication 1 TABLET(S): at 11:51

## 2017-10-09 RX ADMIN — Medication 650 MILLIGRAM(S): at 14:30

## 2017-10-09 RX ADMIN — Medication 650 MILLIGRAM(S): at 20:45

## 2017-10-09 RX ADMIN — MONTELUKAST 10 MILLIGRAM(S): 4 TABLET, CHEWABLE ORAL at 11:51

## 2017-10-09 RX ADMIN — Medication 650 MILLIGRAM(S): at 02:50

## 2017-10-09 RX ADMIN — LIDOCAINE 1 PATCH: 4 CREAM TOPICAL at 15:23

## 2017-10-09 NOTE — PROGRESS NOTE ADULT - PROBLEM SELECTOR PLAN 3
likely 2/2 hypovolemia, decreased Po intake X days. S/p 500 mL NS, will f/u BMP in AM.
C/w home meds

## 2017-10-09 NOTE — DISCHARGE NOTE ADULT - PATIENT PORTAL LINK FT
“You can access the [facility] Patient Portal, offered by [facility] [place], by registering with the following website: [email or web address]”

## 2017-10-09 NOTE — PHYSICAL THERAPY INITIAL EVALUATION ADULT - IMPAIRMENTS FOUND, PT EVAL
aerobic capacity/endurance/gross motor/gait, locomotion, and balance/posture/poor safety awareness/muscle strength

## 2017-10-09 NOTE — DISCHARGE NOTE ADULT - CARE PLAN
Principal Discharge DX:	Acute bilateral thoracic back pain  Goal:	pain control  Secondary Diagnosis:	COPD (chronic obstructive pulmonary disease)  Goal:	symptoms control  Instructions for follow-up, activity and diet:	Sarcoidosis. Use nebs as needed, Oxygen, prednisone 5 mg PO daily take it w/ food  Secondary Diagnosis:	Hyperlipemia  Goal:	Lipids control  Secondary Diagnosis:	Hypertension  Goal:	BP control Principal Discharge DX:	Acute bilateral thoracic back pain  Goal:	pain control  Instructions for follow-up, activity and diet:	Please wear a back brace while ambulating. You can purchase and apply Salonpas patch for pain control  Secondary Diagnosis:	COPD (chronic obstructive pulmonary disease)  Goal:	symptoms control  Instructions for follow-up, activity and diet:	Sarcoidosis. Use nebs as needed, Oxygen, prednisone 5 mg PO daily take it w/ food  Secondary Diagnosis:	Hyperlipemia  Goal:	Lipids control  Instructions for follow-up, activity and diet:	Continue daily Zocor  Secondary Diagnosis:	Hypertension  Goal:	BP control  Instructions for follow-up, activity and diet:	Continue daily Enalapril Principal Discharge DX:	Acute bilateral thoracic back pain  Goal:	pain control  Instructions for follow-up, activity and diet:	Please wear a back brace while ambulating. You can purchase and apply Salonpas patch for pain control  Secondary Diagnosis:	COPD (chronic obstructive pulmonary disease)  Goal:	symptoms control  Instructions for follow-up, activity and diet:	Sarcoidosis. Use nebs as needed, Oxygen, prednisone 5 mg PO daily take it w/ food  Secondary Diagnosis:	Hyperlipemia  Goal:	Lipids control  Instructions for follow-up, activity and diet:	Continue daily Zocor  Secondary Diagnosis:	Hypertension  Goal:	BP control  Instructions for follow-up, activity and diet:	Continue daily Enalapril, your dosage was increased

## 2017-10-09 NOTE — CHART NOTE - NSCHARTNOTEFT_GEN_A_CORE
Acute bilateral thoracic back pain x-ray multiple old compression Fx. Spoke with KATHLEEN Crowell, will bring the brace tomorrow     ADSNP [id number]

## 2017-10-09 NOTE — PROGRESS NOTE ADULT - PROBLEM SELECTOR PLAN 1
May be 2/2 viral gastroenteritis. Supportive care for now, monitor.  zofran PRN
May be 2/2 viral gastroenteritis. Supportive care for now, monitor.  zofran PRN

## 2017-10-09 NOTE — DISCHARGE NOTE ADULT - HOSPITAL COURSE
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   Nausea, Supportive care, treated w/zofran PRN, symptoms resolved     Acute bilateral thoracic back pain, xray showed__ to r/o compression Fx  Hyponatremia likely 2/2 hypovolemia, decreased Po intake X days. S/p 500 mL NS  Essential hypertension C/w home meds.   SarcoidosisC/w nebs, Oxygen, prednisone 5 mg PO daily.   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   Nausea, Supportive care, treated w/zofran PRN, symptoms resolved     Acute bilateral thoracic back pain, xray showed: The bones are severely demineralized. There is an unchanged moderate compression fracture of T10, mild compression fracture of T12, mild compression fracture of L2, and moderate compression fracture of L3. No new compression fractures are identified. Multilevel degenerative spondylosis as evidenced by varying degrees of intervertebral disc height loss, discussed by complex is, and facet joint arthrosis. A left total hip arthroplasty is present. Calcifications of the aorta and its major branches. Spine brace orderd___    Hyponatremia likely 2/2 hypovolemia, decreased Po intake X days. S/p 500 mL NS  Essential hypertension C/w home meds.   Sarcoidosis C/w nebs, Oxygen, prednisone 5 mg PO daily.   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   Nausea, Supportive care, treated w/zofran PRN, symptoms resolved     Acute bilateral thoracic back pain, xray showed: The bones are severely demineralized. There is an unchanged moderate compression fracture of T10, mild compression fracture of T12, mild compression fracture of L2, and moderate compression fracture of L3. No new compression fractures are identified. Multilevel degenerative spondylosis as evidenced by varying degrees of intervertebral disc height loss, discussed by complex is, and facet joint arthrosis. A left total hip arthroplasty is present. Calcifications of the aorta and its major branches. Spine brace ordered and made available for pt at bedside    Hyponatremia likely 2/2 hypovolemia, decreased Po intake X days. S/p 500 mL NS  Essential hypertension C/w home meds.   Sarcoidosis C/w nebs, Oxygen, prednisone 5 mg PO daily.   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   Nausea, Supportive care, treated w/zofran PRN, symptoms resolved     Acute bilateral thoracic back pain, xray showed: The bones are severely demineralized. There is an unchanged moderate compression fracture of T10, mild compression fracture of T12, mild compression fracture of L2, and moderate compression fracture of L3. No new compression fractures are identified. Multilevel degenerative spondylosis as evidenced by varying degrees of intervertebral disc height loss, discussed by complex is, and facet joint arthrosis. A left total hip arthroplasty is present. Calcifications of the aorta and its major branches. Spine brace ordered and made available for pt at bedside  pt refused the back brace  sent home w/ PT  pt anxious about going home alone- has 2 children- she needed to stay one more day to arrange private aides- unclear why the children couldn't help with this  patient bp high on day of dc due to anxiety- manual bp taken at which time she sustained a small superficial skin tear.  home care set up for dressing changes  explained to pt that due to steroids long term she is at risk for compression fractures and that back brace would help- she did not like it.  furthermore, her skin is fragile as well due to steroids- explained to pt that as a geriatric pt she gets exposed to the hazards in the hspital by prolonging her stay.    Hyponatremia likely 2/2 hypovolemia, decreased Po intake X days. S/p 500 mL NS  Essential hypertension C/w home meds.   Sarcoidosis C/w nebs, Oxygen, prednisone 5 mg PO daily.   Pt eval rec home with PT

## 2017-10-09 NOTE — PROGRESS NOTE ADULT - PROBLEM SELECTOR PLAN 2
Likely musculoskeletal, however, patient is elderly w/ OP and on chronic steroids, will order xray to r/o compression Fx  tylenol PRN  lidoderm patchese
Likely musculoskeletal, however, patient is elderly w/ OP and on chronic steroids, xray w/ chronic compression Fx- back brace when ambulatory  vit d and ca supplements  tylenol PRN  lidoderm patches

## 2017-10-09 NOTE — PROGRESS NOTE ADULT - ASSESSMENT
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 Nausea resolved- ?d/t gastroenteritis  back pain resolved- spine xray w/ no acute fracture

## 2017-10-09 NOTE — DISCHARGE NOTE ADULT - MEDICATION SUMMARY - MEDICATIONS TO TAKE
I will START or STAY ON the medications listed below when I get home from the hospital:    predniSONE 5 mg oral tablet  -- 1 tab(s) by mouth once a day  -- Indication: For Sarcoidosis    acetaminophen 325 mg oral tablet  -- 2 tab(s) by mouth every 6 hours, As needed, mild to moderate pain  -- Indication: For back pain     enalapril 5 mg oral tablet  -- 1 tab(s) by mouth once a day  -- Indication: For Hypertension    Zocor 10 mg oral tablet  -- 1 tab(s) by mouth once a day (at bedtime)  -- Indication: For Hyperlipemia    ALPRAZolam 0.25 mg oral tablet  -- 1 tab(s) by mouth once a day (at bedtime)  -- Indication: For Anxiety     Ventolin HFA 90 mcg/inh inhalation aerosol  -- 2 puff(s) inhaled 4 times a day, As Needed  -- Indication: For COPD (chronic obstructive pulmonary disease)    albuterol-ipratropium 2.5 mg-0.5 mg/3 mL inhalation solution  -- 3 milliliter(s) inhaled every 6 hours, As needed, Shortness of Breath and/or Wheezing  -- Indication: For COPD (chronic obstructive pulmonary disease)    polyethylene glycol 3350 oral powder for reconstitution  -- 17 gram(s) by mouth once a day  -- Indication: For COnstipation     docusate sodium 100 mg oral capsule  -- 1 cap(s) by mouth 3 times a day  -- Indication: For COnstipation     senna oral tablet  -- 2 tab(s) by mouth once a day (at bedtime)  -- Indication: For COnstipation     Singulair 10 mg oral tablet  -- 1 tab(s) by mouth once a day  -- Indication: For Prophylaxis     ocular lubricant ophthalmic solution  -- 1 drop(s) to each affected eye every 6 hours, As needed, Dry Eyes  -- Indication: For Prophylaxis     Multiple Vitamins oral tablet  -- 1 tab(s) by mouth once a day  -- Indication: For Prophylaxis     Vitamin D2 50,000 intl units (1.25 mg) oral capsule  -- 1 cap(s) by mouth once a week on Wednesday  -- Indication: For Prophylaxis I will START or STAY ON the medications listed below when I get home from the hospital:    predniSONE 5 mg oral tablet  -- 1 tab(s) by mouth once a day  -- Indication: For Sarcoidosis    acetaminophen 325 mg oral tablet  -- 2 tab(s) by mouth every 6 hours, As needed, mild to moderate pain  -- Indication: For back pain     enalapril 10 mg oral tablet  -- 1 tab(s) by mouth once a day  -- Indication: For High blood pressure    Zocor 10 mg oral tablet  -- 1 tab(s) by mouth once a day (at bedtime)  -- Indication: For Hyperlipemia    ALPRAZolam 0.25 mg oral tablet  -- 1 tab(s) by mouth once a day (at bedtime)  -- Indication: For Anxiety     Ventolin HFA 90 mcg/inh inhalation aerosol  -- 2 puff(s) inhaled 4 times a day, As Needed  -- Indication: For COPD (chronic obstructive pulmonary disease)    albuterol-ipratropium 2.5 mg-0.5 mg/3 mL inhalation solution  -- 3 milliliter(s) inhaled every 6 hours, As needed, Shortness of Breath and/or Wheezing  -- Indication: For COPD (chronic obstructive pulmonary disease)    mupirocin 2% topical ointment  -- 1 application on skin once a day  -- Indication: For Skin tear    polyethylene glycol 3350 oral powder for reconstitution  -- 17 gram(s) by mouth once a day  -- Indication: For COnstipation     docusate sodium 100 mg oral capsule  -- 1 cap(s) by mouth 3 times a day  -- Indication: For COnstipation     senna oral tablet  -- 2 tab(s) by mouth once a day (at bedtime)  -- Indication: For COnstipation     Singulair 10 mg oral tablet  -- 1 tab(s) by mouth once a day  -- Indication: For Prophylaxis     ocular lubricant ophthalmic solution  -- 1 drop(s) to each affected eye every 6 hours, As needed, Dry Eyes  -- Indication: For Prophylaxis     Multiple Vitamins oral tablet  -- 1 tab(s) by mouth once a day  -- Indication: For Prophylaxis     Vitamin D2 50,000 intl units (1.25 mg) oral capsule  -- 1 cap(s) by mouth once a week on Wednesday  -- Indication: For Prophylaxis

## 2017-10-09 NOTE — DISCHARGE NOTE ADULT - PLAN OF CARE
pain control symptoms control Sarcoidosis. Use nebs as needed, Oxygen, prednisone 5 mg PO daily take it w/ food Lipids control BP control Please wear a back brace while ambulating. You can purchase and apply Salonpas patch for pain control Continue daily Zocor Continue daily Enalapril Continue daily Enalapril, your dosage was increased

## 2017-10-09 NOTE — DISCHARGE NOTE ADULT - MEDICATION SUMMARY - MEDICATIONS TO CHANGE
I will SWITCH the dose or number of times a day I take the medications listed below when I get home from the hospital:  None I will SWITCH the dose or number of times a day I take the medications listed below when I get home from the hospital:    enalapril 5 mg oral tablet  -- 1 tab(s) by mouth once a day

## 2017-10-09 NOTE — PHYSICAL THERAPY INITIAL EVALUATION ADULT - DIAGNOSIS, PT EVAL
admitted for complaints of nausea and bi-lateral low back pain, waiting for imaging results to rule out compression fx

## 2017-10-09 NOTE — PROGRESS NOTE ADULT - SUBJECTIVE AND OBJECTIVE BOX
Patient is a 89y old  Female who presents with a chief complaint of nausea (08 Oct 2017 05:14)      SUBJECTIVE / OVERNIGHT EVENTS: c/o back pain; no further nausea    MEDICATIONS  (STANDING):  ALPRAZolam 0.25 milliGRAM(s) Oral at bedtime  docusate sodium 100 milliGRAM(s) Oral two times a day  enalapril 5 milliGRAM(s) Oral daily  enoxaparin Injectable 30 milliGRAM(s) SubCutaneous every 24 hours  ergocalciferol 93514 Unit(s) Oral <User Schedule>  lidocaine   Patch 1 Patch Transdermal daily  montelukast 10 milliGRAM(s) Oral daily  multivitamin 1 Tablet(s) Oral daily  polyethylene glycol 3350 17 Gram(s) Oral daily  predniSONE   Tablet 5 milliGRAM(s) Oral daily  senna 2 Tablet(s) Oral at bedtime  simvastatin 10 milliGRAM(s) Oral at bedtime    MEDICATIONS  (PRN):  acetaminophen   Tablet. 650 milliGRAM(s) Oral every 6 hours PRN mild to moderate pain  ALBUTerol    90 MICROgram(s) HFA Inhaler 2 Puff(s) Inhalation every 6 hours PRN Shortness of Breath and/or Wheezing  ALBUTerol/ipratropium for Nebulization 3 milliLiter(s) Nebulizer every 6 hours PRN Shortness of Breath and/or Wheezing  ondansetron Injectable 4 milliGRAM(s) IV Push every 6 hours PRN Nausea and/or Vomiting      Meds ordered within last 24hours  sodium chloride 0.9% Bolus:   500 milliLiter(s), IV Bolus, once, infuse over 1 Hr, Stop After 1 Doses  Provider's Contact #: [phone] (10-07 @ 23:36)  ondansetron Injectable: [Ordered as ZOFRAN Injectable]  4 milliGRAM(s), IV Push, once, Stop After 1 Doses  Administration Instructions: Max IV Push dose 8 milliGRAM(s)  IF IV PUSH, ADMINISTER OVER 2-5 MIN  Provider's Contact #: [phone] (10-07 @ 23:36)  (Floorstock):   Qty Removed: 1 each (10-07 @ 23:47)  ondansetron Injectable: [Ordered as ZOFRAN Injectable]  4 milliGRAM(s), IV Push, once, Stop After 1 Doses  Administration Instructions: Max IV Push dose 8 milliGRAM(s)  IF IV PUSH, ADMINISTER OVER 2-5 MIN  Provider's Contact #: [phone] (10-08 @ 03:01)  (Floorstock):   Qty Removed: 1 each (10-08 @ 03:09)  metoclopramide Injectable: [Ordered as REGLAN Injectable]  5 milliGRAM(s), IV Push, once, Stop After 1 Doses  Administration Instructions: Max IV Push dose 10 milliGRAM(s)  IF IV PUSH, ADMINISTER OVER 2 MIN  Provider's Contact #: [phone] (10-08 @ 04:46)  enoxaparin Injectable: [Known as LOVENOX]  30 milliGRAM(s), SubCutaneous, every 24 hours  Provider's Contact #: [phone] (10-08 @ 05:14)  acetaminophen   Tablet.: [Known as TYLENOL.]  650 milliGRAM(s), Oral, every 6 hours, PRN for mild to moderate pain  Administration Instructions: MAX DAILY DOSE:  ADULT = 4,000 mG/Day (10-08 @ 05:14)  ondansetron Injectable: [Ordered as ZOFRAN Injectable]  4 milliGRAM(s), IV Push, every 6 hours, PRN for Nausea and/or Vomiting  Administration Instructions: Max IV Push dose 8 milliGRAM(s)  IF IV PUSH, ADMINISTER OVER 2-5 MIN  Provider's Contact #: [phone] (10-08 @ 05:14)  predniSONE   Tablet: [Known as DELTASONE]  5 milliGRAM(s), Oral, daily  Administration Instructions: This is a Look-alike/Sound-alike Medication  Provider's Contact #: [phone] (10-08 @ 05:28)  enalapril: [Known as VASOTEC]  5 milliGRAM(s), Oral, daily  Special Instructions: hold for SBP <110  Provider's Contact #: [phone] (10-08 @ 05:28)  simvastatin: [Known as ZOCOR]  10 milliGRAM(s), Oral, at bedtime  Provider's Contact #: [phone] (10-08 @ 05:28)  ALPRAZolam: [Known as XANAX]  0.25 milliGRAM(s), Oral, at bedtime  Administration Instructions: This is a Look-alike/Sound-alike Medication  Provider's Contact #: [phone] (10-08 @ 05:28)  ALBUTerol/ipratropium for Nebulization: Known as DUONEB  3 milliLiter(s), Nebulizer, every 6 hours, PRN for Shortness of Breath and/or Wheezing  Special Instructions: Continue small volume nebulizer treatment until respiratory assessment and patient education determines treatment can be converted to MDI per Pharmacy [facility] protocol. Discontinue nebulizer order once patient has converted to MDI.  Below is the criteria that must be met to make the conversion:  1.Patient is cooperative, alert, oriented & follows instructions  2.Patient has sufficient pre-bronchodilator inspiratory flow to be able to take a slow, deep inspiration and hold their breath f  Administration Instructions: Contains: 2.5 mG albuterol and 0.5 mG ipratropium  This is a Look-alike/Sound-alike Medication  Provider's Contact #: [phone] (10-08 @ 05:28)  ALBUTerol    90 MICROgram(s) HFA Inhaler: Known as PROVENTIL HFA  2 Puff(s), Inhalation, every 6 hours, PRN for Shortness of Breath and/or Wheezing  Administration Instructions: Return unused medication to Pharmacy.  This is a Look-alike/Sound-alike Medication  Provider's Contact #: [phone] (10-08 @ 05:28)  docusate sodium: [Known as COLACE]  100 milliGRAM(s), Oral, two times a day  Provider's Contact #: [phone] (10-08 @ 05:28)  polyethylene glycol 3350: [Known as MIRALAX]  17 Gram(s), Oral, daily  Administration Instructions: Dissolve in 8 ounces water, juice, cola or tea.  Provider's Contact #: [phone] (10-08 @ 05:28)  senna:   2 Tablet(s), Oral, at bedtime  Provider's Contact #: [phone] (10-08 @ 05:28)  montelukast: [Known as SINGULAIR]  10 milliGRAM(s), Oral, daily  Provider's Contact #: [phone] (10-08 @ 05:28)  multivitamin:   1 Tablet(s), Oral, daily  Provider's Contact #: [phone] (10-08 @ 05:28)  ergocalciferol: [Known as DRISDOL (VITAMIN-D)]  50,000 Unit(s), Oral, <User Schedule> ( every 1 week: Wed/08:00 )  Provider's Contact #: [phone] (10-08 @ 05:28)  lidocaine   Patch: [Known as LIDODERM]  1 Patch, Transdermal, daily  Administration Instructions: Patch may remain in place for up to 12 hours in any 24-hour period.  * External Use Only *  Provider's Contact #: [phone] (10-08 @ 13:04)      T(C): 37 (10-08-17 @ 04:39), Max: 37.1 (10-07-17 @ 22:22)  HR: 84 (10-08-17 @ 04:39) (71 - 88)  BP: 152/92 (10-08-17 @ 04:39) (128/83 - 189/101)  RR: 17 (10-08-17 @ 04:39) (16 - 18)  SpO2: 98% (10-08-17 @ 04:39) (96% - 100%)    CAPILLARY BLOOD GLUCOSE        I&O's Summary      PHYSICAL EXAM:  GENERAL: NAD  CHEST/LUNG: Clear to auscultation bilaterally; No wheeze  HEART: Regular rate and rhythm; No murmurs, rubs, or gallops  ABDOMEN: Soft, Nontender, Nondistended; Bowel sounds present  EXTREMITIES:  No clubbing, cyanosis, or edema      LABS:                        12.6   7.69  )-----------( 237      ( 07 Oct 2017 23:55 )             38.1     10-07    132<L>  |  93<L>  |  19  ----------------------------<  139<H>  4.4   |  31  |  0.56    Ca    8.5      07 Oct 2017 23:55    TPro  6.6  /  Alb  3.4  /  TBili  0.4  /  DBili  x   /  AST  30  /  ALT  22  /  AlkPhos  69  10-07      CARDIAC MARKERS ( 07 Oct 2017 23:55 )  x     / < 0.06 ng/mL / 50 u/L / 2.85 ng/mL / x              RADIOLOGY & ADDITIONAL TESTS:    Imaging Personally Reviewed:    Consultant(s) Notes Reviewed:      Care Discussed with Consultants/Other Providers:
Patient is a 89y old  Female who presents with a chief complaint of nausea (09 Oct 2017 08:03)      SUBJECTIVE / OVERNIGHT EVENTS: when seen at 1p- pt was nervous about going home alone- yesterday she was insisting on leaving  today says pain in back is better as is nausea  nervous about going home alone    MEDICATIONS  (STANDING):  ALPRAZolam 0.25 milliGRAM(s) Oral at bedtime  cholecalciferol 1000 Unit(s) Oral daily  docusate sodium 100 milliGRAM(s) Oral two times a day  enalapril 5 milliGRAM(s) Oral daily  enoxaparin Injectable 30 milliGRAM(s) SubCutaneous every 24 hours  ergocalciferol 91463 Unit(s) Oral <User Schedule>  lidocaine   Patch 1 Patch Transdermal daily  montelukast 10 milliGRAM(s) Oral daily  multivitamin 1 Tablet(s) Oral daily  polyethylene glycol 3350 17 Gram(s) Oral daily  predniSONE   Tablet 5 milliGRAM(s) Oral daily  senna 2 Tablet(s) Oral at bedtime  simvastatin 10 milliGRAM(s) Oral at bedtime    MEDICATIONS  (PRN):  acetaminophen   Tablet. 650 milliGRAM(s) Oral every 6 hours PRN mild to moderate pain  ALBUTerol    90 MICROgram(s) HFA Inhaler 2 Puff(s) Inhalation every 6 hours PRN Shortness of Breath and/or Wheezing  ALBUTerol/ipratropium for Nebulization 3 milliLiter(s) Nebulizer every 6 hours PRN Shortness of Breath and/or Wheezing  ondansetron Injectable 4 milliGRAM(s) IV Push every 6 hours PRN Nausea and/or Vomiting      Meds ordered within last 24hours  hydrALAZINE: [Known as APRESOLINE]  10 milliGRAM(s), Oral, once, Stop After 1 Doses  Administration Instructions: This is a Look-alike/Sound-alike Medication  Provider's Contact #: [phone] (10-08 @ 22:06)  cholecalciferol: [Ordered as VITAMIN D3]  1000 Unit(s), Oral, daily  Provider's Contact #: [phone] (10-09 @ 16:20)      T(C): 36.7 (10-09-17 @ 06:07), Max: 36.9 (10-08-17 @ 21:45)  HR: 70 (10-09-17 @ 06:07) (70 - 74)  BP: 142/92 (10-09-17 @ 06:07) (142/92 - 182/96)  RR: 18 (10-09-17 @ 06:07) (18 - 18)  SpO2: 100% (10-09-17 @ 06:07) (100% - 100%)    CAPILLARY BLOOD GLUCOSE        I&O's Summary      PHYSICAL EXAM:  GENERAL: NAD  CHEST/LUNG: Clear to auscultation bilaterally; No wheeze  HEART: Regular rate and rhythm; No murmurs, rubs, or gallops  ABDOMEN: Soft, Nontender, Nondistended; Bowel sounds present  EXTREMITIES:  No clubbing, cyanosis, or edema      LABS:                        12.6   7.69  )-----------( 237      ( 07 Oct 2017 23:55 )             38.1     10-09    134<L>  |  94<L>  |  17  ----------------------------<  81  4.5   |  30  |  0.52    Ca    8.4      09 Oct 2017 06:50  Phos  3.0     10-09  Mg     1.7     10-09    TPro  6.6  /  Alb  3.4  /  TBili  0.4  /  DBili  x   /  AST  30  /  ALT  22  /  AlkPhos  69  10-07      CARDIAC MARKERS ( 07 Oct 2017 23:55 )  x     / < 0.06 ng/mL / 50 u/L / 2.85 ng/mL / x          Urinalysis Basic - ( 08 Oct 2017 21:23 )    Color: YELLOW / Appearance: CLEAR / S.021 / pH: 6.0  Gluc: NEGATIVE / Ketone: NEGATIVE  / Bili: NEGATIVE / Urobili: NORMAL E.U.   Blood: NEGATIVE / Protein: 100 / Nitrite: NEGATIVE   Leuk Esterase: NEGATIVE / RBC: 5-10 / WBC 0-2   Sq Epi: OCC / Non Sq Epi: x / Bacteria: x        RADIOLOGY & ADDITIONAL TESTS:    Imaging Personally Reviewed:    Consultant(s) Notes Reviewed:      Care Discussed with Consultants/Other Providers:

## 2017-10-10 VITALS
OXYGEN SATURATION: 100 % | DIASTOLIC BLOOD PRESSURE: 63 MMHG | TEMPERATURE: 98 F | SYSTOLIC BLOOD PRESSURE: 119 MMHG | HEART RATE: 81 BPM

## 2017-10-10 PROCEDURE — 99239 HOSP IP/OBS DSCHRG MGMT >30: CPT

## 2017-10-10 RX ORDER — MUPIROCIN 20 MG/G
1 OINTMENT TOPICAL DAILY
Qty: 0 | Refills: 0 | Status: DISCONTINUED | OUTPATIENT
Start: 2017-10-10 | End: 2017-10-10

## 2017-10-10 RX ORDER — MUPIROCIN 20 MG/G
1 OINTMENT TOPICAL
Qty: 1 | Refills: 0 | OUTPATIENT
Start: 2017-10-10

## 2017-10-10 RX ADMIN — Medication 5 MILLIGRAM(S): at 08:31

## 2017-10-10 RX ADMIN — Medication 650 MILLIGRAM(S): at 05:20

## 2017-10-10 RX ADMIN — Medication 1 TABLET(S): at 12:25

## 2017-10-10 RX ADMIN — Medication 5 MILLIGRAM(S): at 05:22

## 2017-10-10 RX ADMIN — Medication 650 MILLIGRAM(S): at 06:10

## 2017-10-10 RX ADMIN — Medication 1000 UNIT(S): at 12:24

## 2017-10-10 RX ADMIN — LIDOCAINE 1 PATCH: 4 CREAM TOPICAL at 03:36

## 2017-10-10 RX ADMIN — POLYETHYLENE GLYCOL 3350 17 GRAM(S): 17 POWDER, FOR SOLUTION ORAL at 12:24

## 2017-10-10 RX ADMIN — LIDOCAINE 1 PATCH: 4 CREAM TOPICAL at 12:25

## 2017-10-10 RX ADMIN — MONTELUKAST 10 MILLIGRAM(S): 4 TABLET, CHEWABLE ORAL at 12:24

## 2017-10-10 RX ADMIN — Medication 3 MILLILITER(S): at 11:25

## 2017-10-10 RX ADMIN — Medication 100 MILLIGRAM(S): at 05:21

## 2017-10-10 RX ADMIN — Medication 5 MILLIGRAM(S): at 05:21

## 2017-10-10 NOTE — CHART NOTE - NSCHARTNOTEFT_GEN_A_CORE
pt seen and examined at 1145a- was upset re: skin tear that occurred on right forearm during bp check  no c/o back pain  upset that she couldn't see the pain specialist- told her that that wasn't necessary  she declined the brace when it was delivered to her bedside around 1p- writer tried to help her in it but she refused it  pt overwhelmed- says her children can't help- son is in between jobs and dgtr is a nurse but is a "recluse" and is "sick".  It is unclear why the children couldn't have helped set up the private aides for the pt so she could have gone home yesterday  educated pt on the hazards of staying extra days in the hospital at which point she got upset w/ writer.  when she calmed down she was amenable to further discussion- ok to go home as she has aides set up  she was going to pain management for epidurals in her back??  unclear why as she was only on tylenol PRN- xray report given to pt that she has old spine fractures.    pt is extremely anxious and gets overwhelmed re: dc planning (known to writer from last admission when she ended up going to rehab bc she was alone at home).  on exam pt is anxious; lungs clear; able to stand  pt will be at high risk for rehospitalization due to limited social support    time 40 min

## 2018-01-10 ENCOUNTER — APPOINTMENT (OUTPATIENT)
Dept: WOUND CARE | Facility: CLINIC | Age: 83
End: 2018-01-10
Payer: MEDICARE

## 2018-01-10 DIAGNOSIS — I73.9 PERIPHERAL VASCULAR DISEASE, UNSPECIFIED: ICD-10-CM

## 2018-01-10 DIAGNOSIS — S81.809A UNSPECIFIED OPEN WOUND, UNSPECIFIED LOWER LEG, INITIAL ENCOUNTER: ICD-10-CM

## 2018-01-10 PROCEDURE — 99214 OFFICE O/P EST MOD 30 MIN: CPT

## 2018-01-19 ENCOUNTER — APPOINTMENT (OUTPATIENT)
Dept: VASCULAR SURGERY | Facility: CLINIC | Age: 83
End: 2018-01-19
Payer: MEDICARE

## 2018-01-19 VITALS
HEIGHT: 63 IN | DIASTOLIC BLOOD PRESSURE: 83 MMHG | TEMPERATURE: 98.5 F | SYSTOLIC BLOOD PRESSURE: 162 MMHG | WEIGHT: 151 LBS | BODY MASS INDEX: 26.75 KG/M2 | HEART RATE: 92 BPM

## 2018-01-19 DIAGNOSIS — I77.1 STRICTURE OF ARTERY: ICD-10-CM

## 2018-01-19 DIAGNOSIS — I83.813 VARICOSE VEINS OF BILATERAL LOWER EXTREMITIES WITH PAIN: ICD-10-CM

## 2018-01-19 PROCEDURE — 99214 OFFICE O/P EST MOD 30 MIN: CPT

## 2018-02-02 ENCOUNTER — APPOINTMENT (OUTPATIENT)
Dept: VASCULAR SURGERY | Facility: CLINIC | Age: 83
End: 2018-02-02
Payer: MEDICARE

## 2018-02-02 VITALS
HEART RATE: 88 BPM | DIASTOLIC BLOOD PRESSURE: 97 MMHG | SYSTOLIC BLOOD PRESSURE: 151 MMHG | HEIGHT: 63 IN | BODY MASS INDEX: 18.07 KG/M2 | WEIGHT: 102 LBS | TEMPERATURE: 97.7 F

## 2018-02-02 DIAGNOSIS — I83.819 VARICOSE VEINS OF UNSPECIFIED LOWER EXTREMITY WITH PAIN: ICD-10-CM

## 2018-02-02 DIAGNOSIS — Z00.00 ENCOUNTER FOR GENERAL ADULT MEDICAL EXAMINATION W/OUT ABNORMAL FINDINGS: ICD-10-CM

## 2018-02-02 DIAGNOSIS — I83.899 VARICOSE VEINS OF UNSPECIFIED LOWER EXTREMITY WITH OTHER COMPLICATIONS: ICD-10-CM

## 2018-02-02 DIAGNOSIS — I87.2 VENOUS INSUFFICIENCY (CHRONIC) (PERIPHERAL): ICD-10-CM

## 2018-02-02 PROCEDURE — 99214 OFFICE O/P EST MOD 30 MIN: CPT

## 2018-02-11 ENCOUNTER — EMERGENCY (EMERGENCY)
Facility: HOSPITAL | Age: 83
LOS: 1 days | Discharge: HOME | End: 2018-02-11
Attending: EMERGENCY MEDICINE | Admitting: EMERGENCY MEDICINE
Payer: MEDICARE

## 2018-02-11 VITALS
SYSTOLIC BLOOD PRESSURE: 192 MMHG | TEMPERATURE: 98 F | HEART RATE: 86 BPM | RESPIRATION RATE: 18 BRPM | OXYGEN SATURATION: 100 % | DIASTOLIC BLOOD PRESSURE: 98 MMHG

## 2018-02-11 VITALS
RESPIRATION RATE: 16 BRPM | DIASTOLIC BLOOD PRESSURE: 89 MMHG | OXYGEN SATURATION: 100 % | TEMPERATURE: 98 F | HEART RATE: 69 BPM | SYSTOLIC BLOOD PRESSURE: 183 MMHG

## 2018-02-11 DIAGNOSIS — Z87.81 PERSONAL HISTORY OF (HEALED) TRAUMATIC FRACTURE: Chronic | ICD-10-CM

## 2018-02-11 LAB
ALBUMIN SERPL ELPH-MCNC: 3.8 G/DL — SIGNIFICANT CHANGE UP (ref 3.3–5)
ALP SERPL-CCNC: 102 U/L — SIGNIFICANT CHANGE UP (ref 40–120)
ALT FLD-CCNC: 13 U/L — SIGNIFICANT CHANGE UP (ref 4–33)
AST SERPL-CCNC: 24 U/L — SIGNIFICANT CHANGE UP (ref 4–32)
BASOPHILS # BLD AUTO: 0.02 K/UL — SIGNIFICANT CHANGE UP (ref 0–0.2)
BASOPHILS NFR BLD AUTO: 0.3 % — SIGNIFICANT CHANGE UP (ref 0–2)
BILIRUB SERPL-MCNC: 0.3 MG/DL — SIGNIFICANT CHANGE UP (ref 0.2–1.2)
BUN SERPL-MCNC: 17 MG/DL — SIGNIFICANT CHANGE UP (ref 7–23)
CALCIUM SERPL-MCNC: 8.8 MG/DL — SIGNIFICANT CHANGE UP (ref 8.4–10.5)
CHLORIDE SERPL-SCNC: 87 MMOL/L — LOW (ref 98–107)
CO2 SERPL-SCNC: 37 MMOL/L — HIGH (ref 22–31)
CREAT SERPL-MCNC: 0.57 MG/DL — SIGNIFICANT CHANGE UP (ref 0.5–1.3)
EOSINOPHIL # BLD AUTO: 0.02 K/UL — SIGNIFICANT CHANGE UP (ref 0–0.5)
EOSINOPHIL NFR BLD AUTO: 0.3 % — SIGNIFICANT CHANGE UP (ref 0–6)
GLUCOSE SERPL-MCNC: 117 MG/DL — HIGH (ref 70–99)
HCT VFR BLD CALC: 41 % — SIGNIFICANT CHANGE UP (ref 34.5–45)
HGB BLD-MCNC: 12.8 G/DL — SIGNIFICANT CHANGE UP (ref 11.5–15.5)
IMM GRANULOCYTES # BLD AUTO: 0.01 # — SIGNIFICANT CHANGE UP
IMM GRANULOCYTES NFR BLD AUTO: 0.1 % — SIGNIFICANT CHANGE UP (ref 0–1.5)
LYMPHOCYTES # BLD AUTO: 0.82 K/UL — LOW (ref 1–3.3)
LYMPHOCYTES # BLD AUTO: 10.9 % — LOW (ref 13–44)
MCHC RBC-ENTMCNC: 31.2 % — LOW (ref 32–36)
MCHC RBC-ENTMCNC: 31.4 PG — SIGNIFICANT CHANGE UP (ref 27–34)
MCV RBC AUTO: 100.5 FL — HIGH (ref 80–100)
MONOCYTES # BLD AUTO: 0.61 K/UL — SIGNIFICANT CHANGE UP (ref 0–0.9)
MONOCYTES NFR BLD AUTO: 8.1 % — SIGNIFICANT CHANGE UP (ref 2–14)
NEUTROPHILS # BLD AUTO: 6.05 K/UL — SIGNIFICANT CHANGE UP (ref 1.8–7.4)
NEUTROPHILS NFR BLD AUTO: 80.3 % — HIGH (ref 43–77)
NRBC # FLD: 0 — SIGNIFICANT CHANGE UP
PLATELET # BLD AUTO: 241 K/UL — SIGNIFICANT CHANGE UP (ref 150–400)
PMV BLD: 9.3 FL — SIGNIFICANT CHANGE UP (ref 7–13)
POTASSIUM SERPL-MCNC: 4.7 MMOL/L — SIGNIFICANT CHANGE UP (ref 3.5–5.3)
POTASSIUM SERPL-SCNC: 4.7 MMOL/L — SIGNIFICANT CHANGE UP (ref 3.5–5.3)
PROT SERPL-MCNC: 6.8 G/DL — SIGNIFICANT CHANGE UP (ref 6–8.3)
RBC # BLD: 4.08 M/UL — SIGNIFICANT CHANGE UP (ref 3.8–5.2)
RBC # FLD: 13.2 % — SIGNIFICANT CHANGE UP (ref 10.3–14.5)
SODIUM SERPL-SCNC: 133 MMOL/L — LOW (ref 135–145)
WBC # BLD: 7.53 K/UL — SIGNIFICANT CHANGE UP (ref 3.8–10.5)
WBC # FLD AUTO: 7.53 K/UL — SIGNIFICANT CHANGE UP (ref 3.8–10.5)

## 2018-02-11 PROCEDURE — 93010 ELECTROCARDIOGRAM REPORT: CPT

## 2018-02-11 PROCEDURE — 99284 EMERGENCY DEPT VISIT MOD MDM: CPT | Mod: 25,GC

## 2018-02-11 RX ADMIN — Medication 5 MILLIGRAM(S): at 21:38

## 2018-02-11 NOTE — ED ADULT TRIAGE NOTE - CHIEF COMPLAINT QUOTE
Pt comes from home with elevated blood pressure, takes medication daily complaint with medications home health aide at bedside,  pt states "blood pressure gets higher with anxiety and panic attacks"

## 2018-02-11 NOTE — ED PROVIDER NOTE - ATTENDING CONTRIBUTION TO CARE
Attending Attestation: Dr. Rouse  I have personally performed a history and physical examination of the patient and discussed management with the resident as well as the patient.  I reviewed the resident's note and agree with the documented findings and plan of care.  I have authored and modified critical sections of the Provider Note, including but not limited to HPI, Physical Exam and MDM. Elevated bp in an anxious patient, asymptomatic then and now, given age and risk factors will check basics, ecg, monitor and re-assess. Well appearing otherwise and is in USOH.

## 2018-02-11 NOTE — ED ADULT NURSE NOTE - OBJECTIVE STATEMENT
Pt arrives with co feeling anxious after aide had to leave.  . Pt denies sob or chest. Pt brought in by aide for evaluation of elevated b/p. Pt now awaiting further md Evaluation.

## 2018-02-11 NOTE — ED PROVIDER NOTE - MEDICAL DECISION MAKING DETAILS
elevated bp in an anxious patient, asymptomatic then and now, given age and risk factors will check basics, ecg, monitor and re-assess Elevated bp in an anxious patient, asymptomatic then and now, given age and risk factors will check basics, ecg, monitor and re-assess. Well appearing otherwise and is in USOH.

## 2018-02-11 NOTE — ED PROVIDER NOTE - OBJECTIVE STATEMENT
89f with pmh  of hld, htn, sarocoid on 2L 02 at baseline p/w elevated bp; pt states she got nervous when her aid left, checked bp it was sbp ~200, and she got worried, but then calmed down and rpt sbp was ~170; never had cp/sob/n/v/d/dysuria/cough/cold. no increased 02 requirements; 89f with pmh  of hld, htn, sarcoid on 2L 02 at baseline p/w elevated bp; pt states she got nervous when her aid left, checked bp it was sbp ~200, and she got worried, but then calmed down and rpt sbp was ~170; never had cp/sob/n/v/d/dysuria/cough/cold. no increased 02 requirements;  Breathing at baseline, per pt Wants to go home. Well appearing at present.

## 2018-02-13 ENCOUNTER — APPOINTMENT (OUTPATIENT)
Dept: HOME HEALTH SERVICES | Facility: HOME HEALTH | Age: 83
End: 2018-02-13
Payer: MEDICARE

## 2018-02-13 VITALS
RESPIRATION RATE: 16 BRPM | DIASTOLIC BLOOD PRESSURE: 76 MMHG | SYSTOLIC BLOOD PRESSURE: 122 MMHG | HEART RATE: 72 BPM | OXYGEN SATURATION: 99 % | TEMPERATURE: 97.6 F

## 2018-02-13 VITALS
OXYGEN SATURATION: 96 % | TEMPERATURE: 97.8 F | SYSTOLIC BLOOD PRESSURE: 124 MMHG | DIASTOLIC BLOOD PRESSURE: 72 MMHG | HEART RATE: 76 BPM | RESPIRATION RATE: 18 BRPM

## 2018-02-13 DIAGNOSIS — I70.0 ATHEROSCLEROSIS OF AORTA: ICD-10-CM

## 2018-02-13 DIAGNOSIS — M81.8 OTHER OSTEOPOROSIS W/OUT CURRENT PATHOLOGICAL FRACTURE: ICD-10-CM

## 2018-02-13 DIAGNOSIS — I10 ESSENTIAL (PRIMARY) HYPERTENSION: ICD-10-CM

## 2018-02-13 DIAGNOSIS — Z63.4 DISAPPEARANCE AND DEATH OF FAMILY MEMBER: ICD-10-CM

## 2018-02-13 DIAGNOSIS — H61.21 IMPACTED CERUMEN, RIGHT EAR: ICD-10-CM

## 2018-02-13 DIAGNOSIS — T38.0X5A OTHER OSTEOPOROSIS W/OUT CURRENT PATHOLOGICAL FRACTURE: ICD-10-CM

## 2018-02-13 DIAGNOSIS — T14.8XXA OTHER INJURY OF UNSPECIFIED BODY REGION, INITIAL ENCOUNTER: ICD-10-CM

## 2018-02-13 PROCEDURE — G0506: CPT

## 2018-02-13 PROCEDURE — G0439: CPT

## 2018-02-13 PROCEDURE — G0438: CPT

## 2018-02-13 PROCEDURE — 99345 HOME/RES VST NEW HIGH MDM 75: CPT | Mod: 25

## 2018-02-13 RX ORDER — HYDROCHLOROTHIAZIDE 12.5 MG/1
12.5 TABLET ORAL
Qty: 30 | Refills: 0 | Status: DISCONTINUED | COMMUNITY
Start: 2018-01-16 | End: 2018-02-13

## 2018-02-13 RX ORDER — AMOXICILLIN AND CLAVULANATE POTASSIUM 875; 125 MG/1; MG/1
875-125 TABLET, COATED ORAL
Qty: 28 | Refills: 0 | Status: DISCONTINUED | COMMUNITY
Start: 2017-01-13 | End: 2018-02-13

## 2018-02-13 SDOH — SOCIAL STABILITY - SOCIAL INSECURITY: DISSAPEARANCE AND DEATH OF FAMILY MEMBER: Z63.4

## 2018-02-14 ENCOUNTER — EMERGENCY (EMERGENCY)
Facility: HOSPITAL | Age: 83
LOS: 1 days | Discharge: HOME | End: 2018-02-14
Attending: EMERGENCY MEDICINE | Admitting: EMERGENCY MEDICINE
Payer: MEDICARE

## 2018-02-14 ENCOUNTER — OTHER (OUTPATIENT)
Age: 83
End: 2018-02-14

## 2018-02-14 ENCOUNTER — APPOINTMENT (OUTPATIENT)
Dept: HOME HEALTH SERVICES | Facility: HOME HEALTH | Age: 83
End: 2018-02-14

## 2018-02-14 VITALS
HEART RATE: 80 BPM | OXYGEN SATURATION: 100 % | DIASTOLIC BLOOD PRESSURE: 91 MMHG | TEMPERATURE: 98 F | RESPIRATION RATE: 20 BRPM | SYSTOLIC BLOOD PRESSURE: 139 MMHG

## 2018-02-14 VITALS
RESPIRATION RATE: 18 BRPM | TEMPERATURE: 98 F | OXYGEN SATURATION: 99 % | HEART RATE: 82 BPM | SYSTOLIC BLOOD PRESSURE: 140 MMHG | DIASTOLIC BLOOD PRESSURE: 80 MMHG

## 2018-02-14 DIAGNOSIS — Z87.81 PERSONAL HISTORY OF (HEALED) TRAUMATIC FRACTURE: Chronic | ICD-10-CM

## 2018-02-14 PROCEDURE — 99282 EMERGENCY DEPT VISIT SF MDM: CPT

## 2018-02-14 RX ORDER — ALPRAZOLAM 0.25 MG
0.12 TABLET ORAL ONCE
Qty: 0 | Refills: 0 | Status: DISCONTINUED | OUTPATIENT
Start: 2018-02-14 | End: 2018-02-14

## 2018-02-14 RX ORDER — SODIUM CHLORIDE 9 MG/ML
500 INJECTION INTRAMUSCULAR; INTRAVENOUS; SUBCUTANEOUS ONCE
Qty: 0 | Refills: 0 | Status: DISCONTINUED | OUTPATIENT
Start: 2018-02-14 | End: 2018-02-14

## 2018-02-14 RX ADMIN — Medication 0.12 MILLIGRAM(S): at 16:48

## 2018-02-14 RX ADMIN — Medication 10 MILLIGRAM(S): at 12:47

## 2018-02-14 NOTE — ED PROVIDER NOTE - SKIN WOUND TYPE
b/l knees: + superficial skin tears/abrasions with mild active bleeding and surrounding erythema. mild swelling bl knees with slight tenderness to palpation. full ROM.              left wrsit: + supeficial skin tear with active bleeding and surrounding ecchymosis. + mild swelling to wrist with full rom and no tenderness to palption.      right forearem: + skin tear/abrasion with mild active bleeding intact thin friable skin. nontender to palpitaion to skin surrounding area and soft tissues. full rom arm. sensations intact.

## 2018-02-14 NOTE — SOCIAL WORK INITIAL EVALUATION ADULT - PLAN
SW met with 89 year old  female and her aide at bedside.     Patient is alert and oriented x4.    Provided emotional support and discussed access to community resources.   Patient lives in a  alone.     Patient reports that there are approximately 6 steps outside of the home and she has a chairlift within the home.      Patient uses a walker to assist with ambulation.      Patient is on oxygen at home.     Patient's son, Weston Magaña ([phone]), is her HCP.      Patient has 3 private hire aides.    One aide comes on T,W,F from 8a-12p and the other 2 come from M-F 2p-6p.     Patient states that she has a [employer]NAP Aide but the aide does not come.     Patient had Replaced by [facility] for wound care but her services recently ended.        Written by NILSA Silva   Reviewed by Zoey Brunson LMSW SW met with 89 year old , , female at bedside in ED.  Patient is alert and oriented x4.    Provided emotional support and discussed access to community resources.   Patient lives in a  alone.     Patient reports that there are approximately 6 steps outside of the home and she has a chairlift within the home.   Patient uses a walker to assist with ambulation.  Patient is on oxygen at home.     Patient's son, Weston Magaña ([phone]), is her HCP.  Patient reports she has some private hire aides, but is not happy with current services.  Patient is a new [facility] house Calls patient ([phone]), Dr. Jyoti Damian is MD, [place] calls  to visit patient at home tomorrow.   Connected patient to [facility] at home ([phone]) and they arranged a private hire aide to come to hospital today to go home with her.  Patient has [facility] home care services ([phone]) starting tomorrow for wound care.    Patient to be transported home this evening by [facility] ([phone]) at 530pm, on oxygen, with aide.  Patient and family aware and in agreement with plan.       Written by NILSA Silva   Reviewed by Zoey Brunson LMSW

## 2018-02-14 NOTE — ED PROVIDER NOTE - OBJECTIVE STATEMENT
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 pt. states she was bending over to turn off radiator valve and fell forward on her knees and arms bracing the fall. Pt denies head trauma or loc. Was able to get up after fall and called EMS due to amount of bleeding from cuts. States she was able to walk to the door and let EMS in. Pt. has hx neuroapthy and pvd - states walks with walker and gait is unchanged since fall. denies head trauma loc dizziness weakness blurry vision back pain fevers chills.

## 2018-02-14 NOTE — ED ADULT NURSE NOTE - OBJECTIVE STATEMENT
patient received to rm 12 s/p unwitnesed fall at home, patient  as&ox3, alert and irritable that she is back in ED, patient arrives with bandages on rt and lft arm from ems, patient received to rm 12 s/p unwitnesed fall at home, patient  as&ox3, alert and irritable that she is back in ED, patient with lacerataions to lft hand, rt arm, and b/l knees,c/o "burning" at this time.  pt a&oX3, has HHA by bedside. patient received to rm 12 s/p unwitnesed fall at home, patient  as&ox3, alert and irritable that she is back in ED, patient with lacerataions to lft hand, rt arm, and b/l knees,c/o "burning" at this time.  pt a&oX3, has HHA by bedside.  No IV access at this time, otf GREENR

## 2018-02-14 NOTE — ED PROVIDER NOTE - PROGRESS NOTE DETAILS
Had lengthy discussion with patient about need for xrays - patient refusing xrays at this time - understands risk of not getting imgaging including missing fractures or other injuries and possible further dangers including loss of function and even death. Pt. is aware of these risks and understands her decision to refuse xrays/imaging at this time.

## 2018-02-14 NOTE — ED PROVIDER NOTE - ATTENDING CONTRIBUTION TO CARE
MD Parr:  I performed a face to face bedside interview with patient regarding history of present illness, review of symptoms and past medical history. I completed an independent physical exam(documented below).  I have discussed patient's plan of care with ACP.   I agree with note as stated above, having amended the EMR as needed to reflect my findings. I have discussed the assessment and plan of care.  This includes during the time I functioned as the attending physician for this patient.  PE:  Gen: Alert, NAD  Head: NC, AT,  EOMI, normal lids/conjunctiva  ENT:  normal hearing, patent oropharynx without erythema/exudate, uvula midline  Neck: +supple, no tenderness/meningismus/JVD, +Trachea midline  Chest: no chest wall tenderness, equal chest rise  Pulm: Bilateral BS, normal resp effort, no wheeze/stridor/retractions  CV: RRR, no M/R/G, +dist pulses  Abd: soft, NT/ND, +BS, no hepatosplenomegaly  Rectal: deferred  Mskel: mild edema of b/l knees  Skin: multiple skin tears in all extremities, larger tears are on b/l ant knees, left dorsum of hand and right forearm. Large area of ecchymosis right hand.   Neuro: AAOx3, no sensory/motor deficits, CN 2-12 intact   MDM:  89yo F w/ pmh inclusive of copd/sarcoidosis on 2L NC ATC, osteoporosis, htn, see here a few days ago for hypertension 2/2 to anxiety, bibems for multiple abrasions s/p fall, fell forward onto knees and hands while bending to turn off radiator at home, denies head/neck trauma or pain, LOC, or use of blood thinners (despite triage note mention of this). Pt was able to get up on her own and ambulate to door to let EMS in. Denies cp, sob, palpitations, lightheadedness, dizziness before or after fall. H&P most consistent w/ mechanical fall. Multiple skin tears all extremities, no LACS to suture.  Will clean and dress wounds. normal ROM of all extremities with some edema of b/l knees, but pt refusing xrays stating she doesn't think anything is broken and doesn't want the studies done. Pt is AAOX3, and expresses understanding of fall, physician's concern and desire to r/o fracture ,and states she accepts risk of having an undiagnosed/untreated fracture. No indication of head trauma or reason to suspect head bleed at this time.

## 2018-02-14 NOTE — ED ADULT TRIAGE NOTE - CHIEF COMPLAINT QUOTE
Patient brought to ER from home by EMS after she fell in her bedroom. Pt is on blood thinner and fall was unwitnessed. Pt states that she slipped when she was walking around her room. Her skin is paper skin. New lacerations 2.5 in to right forearm, 1.5 inch to left hand and 2in to bilateral knees. She also has 2 old scars with bandages that are wrapped from a previous fall (left FA and Right upper arm)

## 2018-02-14 NOTE — PROVIDER CONTACT NOTE (OTHER) - ASSESSMENT
Patient is known to [facility] for skilled nursing wound care.  Spoke with Chloe at [facility] and updated on patient's condition.  Patient is scheduled for home nurse visit tomorrow.  Discharge ppwk to detail wound care and primary RN to provide patient with initial supplies.

## 2018-02-15 ENCOUNTER — CLINICAL ADVICE (OUTPATIENT)
Age: 83
End: 2018-02-15

## 2018-02-15 ENCOUNTER — APPOINTMENT (OUTPATIENT)
Dept: HOME HEALTH SERVICES | Facility: HOME HEALTH | Age: 83
End: 2018-02-15

## 2018-02-15 ENCOUNTER — OTHER (OUTPATIENT)
Age: 83
End: 2018-02-15

## 2018-02-15 VITALS
RESPIRATION RATE: 18 BRPM | OXYGEN SATURATION: 98 % | SYSTOLIC BLOOD PRESSURE: 124 MMHG | DIASTOLIC BLOOD PRESSURE: 72 MMHG | HEART RATE: 80 BPM | TEMPERATURE: 98.4 F

## 2018-02-15 VITALS
RESPIRATION RATE: 18 BRPM | SYSTOLIC BLOOD PRESSURE: 124 MMHG | TEMPERATURE: 98.2 F | HEART RATE: 80 BPM | OXYGEN SATURATION: 96 % | DIASTOLIC BLOOD PRESSURE: 72 MMHG

## 2018-02-15 VITALS
DIASTOLIC BLOOD PRESSURE: 60 MMHG | TEMPERATURE: 98.2 F | SYSTOLIC BLOOD PRESSURE: 110 MMHG | HEART RATE: 76 BPM | RESPIRATION RATE: 16 BRPM | OXYGEN SATURATION: 96 %

## 2018-02-18 ENCOUNTER — CLINICAL ADVICE (OUTPATIENT)
Age: 83
End: 2018-02-18

## 2018-02-19 ENCOUNTER — CLINICAL ADVICE (OUTPATIENT)
Age: 83
End: 2018-02-19

## 2018-02-19 DIAGNOSIS — K59.09 OTHER CONSTIPATION: ICD-10-CM

## 2018-02-21 ENCOUNTER — APPOINTMENT (OUTPATIENT)
Dept: VASCULAR SURGERY | Facility: CLINIC | Age: 83
End: 2018-02-21

## 2018-02-22 LAB
25(OH)D3 SERPL-MCNC: 71.2 NG/ML
ALBUMIN SERPL ELPH-MCNC: 3.9 G/DL
ALP BLD-CCNC: 99 U/L
ALT SERPL-CCNC: 18 U/L
ANION GAP SERPL CALC-SCNC: 20 MMOL/L
AST SERPL-CCNC: 27 U/L
BASOPHILS # BLD AUTO: 0.01 K/UL
BASOPHILS NFR BLD AUTO: 0.1 %
BILIRUB SERPL-MCNC: 0.5 MG/DL
BUN SERPL-MCNC: 22 MG/DL
CALCIUM SERPL-MCNC: 9.8 MG/DL
CHLORIDE SERPL-SCNC: 85 MMOL/L
CO2 SERPL-SCNC: 31 MMOL/L
CREAT SERPL-MCNC: 0.59 MG/DL
EOSINOPHIL # BLD AUTO: 0.01 K/UL
EOSINOPHIL NFR BLD AUTO: 0.1 %
HBA1C MFR BLD HPLC: 5.7 %
HCT VFR BLD CALC: 40.2 %
HGB BLD-MCNC: 12.5 G/DL
IMM GRANULOCYTES NFR BLD AUTO: 0.1 %
LYMPHOCYTES # BLD AUTO: 0.66 K/UL
LYMPHOCYTES NFR BLD AUTO: 7.4 %
MAN DIFF?: NORMAL
MCHC RBC-ENTMCNC: 31.1 GM/DL
MCHC RBC-ENTMCNC: 32.1 PG
MCV RBC AUTO: 103.3 FL
MONOCYTES # BLD AUTO: 0.55 K/UL
MONOCYTES NFR BLD AUTO: 6.2 %
NEUTROPHILS # BLD AUTO: 7.68 K/UL
NEUTROPHILS NFR BLD AUTO: 86.1 %
PLATELET # BLD AUTO: 240 K/UL
POTASSIUM SERPL-SCNC: 4.8 MMOL/L
PROT SERPL-MCNC: 6.6 G/DL
RBC # BLD: 3.89 M/UL
RBC # FLD: 14.7 %
SODIUM SERPL-SCNC: 136 MMOL/L
TSH SERPL-ACNC: 0.64 UIU/ML
WBC # FLD AUTO: 8.92 K/UL

## 2018-03-01 ENCOUNTER — APPOINTMENT (OUTPATIENT)
Dept: HOME HEALTH SERVICES | Facility: HOME HEALTH | Age: 83
End: 2018-03-01

## 2018-03-02 ENCOUNTER — APPOINTMENT (OUTPATIENT)
Dept: HOME HEALTH SERVICES | Facility: HOME HEALTH | Age: 83
End: 2018-03-02

## 2018-03-07 RX ORDER — SERTRALINE 25 MG/1
25 TABLET, FILM COATED ORAL DAILY
Qty: 90 | Refills: 3 | Status: DISCONTINUED | COMMUNITY
Start: 2018-02-13 | End: 2018-03-07

## 2018-03-12 ENCOUNTER — CLINICAL ADVICE (OUTPATIENT)
Age: 83
End: 2018-03-12

## 2018-03-13 ENCOUNTER — APPOINTMENT (OUTPATIENT)
Dept: HOME HEALTH SERVICES | Facility: HOME HEALTH | Age: 83
End: 2018-03-13

## 2018-03-13 VITALS
OXYGEN SATURATION: 96 % | HEART RATE: 78 BPM | RESPIRATION RATE: 18 BRPM | TEMPERATURE: 98 F | SYSTOLIC BLOOD PRESSURE: 118 MMHG | DIASTOLIC BLOOD PRESSURE: 66 MMHG

## 2018-03-16 RX ORDER — ESCITALOPRAM OXALATE 5 MG/1
5 TABLET ORAL
Qty: 30 | Refills: 2 | Status: DISCONTINUED | COMMUNITY
Start: 2018-03-07 | End: 2018-03-16

## 2018-03-23 ENCOUNTER — CLINICAL ADVICE (OUTPATIENT)
Age: 83
End: 2018-03-23

## 2018-03-23 DIAGNOSIS — N39.0 URINARY TRACT INFECTION, SITE NOT SPECIFIED: ICD-10-CM

## 2018-03-26 RX ORDER — SULFAMETHOXAZOLE AND TRIMETHOPRIM 800; 160 MG/1; MG/1
800-160 TABLET ORAL TWICE DAILY
Qty: 10 | Refills: 0 | Status: COMPLETED | COMMUNITY
Start: 2018-03-23 | End: 2018-03-28

## 2018-04-03 NOTE — H&P ADULT - NSHPPOAURINARYCATHETER_GEN_ALL_CORE
no Deep vein thrombosis  right leg 2004,2005,2012  Diabetes insipidus    Hyperchloremia    Hypertension  off medication post bariatric surgery  Obesity (BMI 30-39.9)    Prediabetes

## 2018-04-08 ENCOUNTER — RX RENEWAL (OUTPATIENT)
Age: 83
End: 2018-04-08

## 2018-04-12 ENCOUNTER — APPOINTMENT (OUTPATIENT)
Dept: HOME HEALTH SERVICES | Facility: HOME HEALTH | Age: 83
End: 2018-04-12

## 2018-04-12 VITALS
OXYGEN SATURATION: 98 % | SYSTOLIC BLOOD PRESSURE: 122 MMHG | TEMPERATURE: 98.2 F | HEART RATE: 84 BPM | DIASTOLIC BLOOD PRESSURE: 64 MMHG | RESPIRATION RATE: 18 BRPM

## 2018-04-12 RX ORDER — SULFAMETHOXAZOLE AND TRIMETHOPRIM 800; 160 MG/1; MG/1
800-160 TABLET ORAL
Refills: 0 | Status: DISCONTINUED | COMMUNITY
End: 2018-04-12

## 2018-04-18 ENCOUNTER — APPOINTMENT (OUTPATIENT)
Dept: HOME HEALTH SERVICES | Facility: HOME HEALTH | Age: 83
End: 2018-04-18

## 2018-04-20 ENCOUNTER — APPOINTMENT (OUTPATIENT)
Dept: HOME HEALTH SERVICES | Facility: HOME HEALTH | Age: 83
End: 2018-04-20
Payer: MEDICARE

## 2018-04-20 ENCOUNTER — APPOINTMENT (OUTPATIENT)
Dept: HOME HEALTH SERVICES | Facility: HOME HEALTH | Age: 83
End: 2018-04-20

## 2018-04-20 VITALS
DIASTOLIC BLOOD PRESSURE: 70 MMHG | TEMPERATURE: 98 F | HEART RATE: 85 BPM | RESPIRATION RATE: 24 BRPM | OXYGEN SATURATION: 97 % | SYSTOLIC BLOOD PRESSURE: 105 MMHG

## 2018-04-20 DIAGNOSIS — F33.9 MAJOR DEPRESSIVE DISORDER, RECURRENT, UNSPECIFIED: ICD-10-CM

## 2018-04-20 DIAGNOSIS — E78.5 HYPERLIPIDEMIA, UNSPECIFIED: ICD-10-CM

## 2018-04-20 DIAGNOSIS — F41.9 ANXIETY DISORDER, UNSPECIFIED: ICD-10-CM

## 2018-04-20 DIAGNOSIS — S81.809A UNSPECIFIED OPEN WOUND, UNSPECIFIED LOWER LEG, INITIAL ENCOUNTER: ICD-10-CM

## 2018-04-20 DIAGNOSIS — D86.9 SARCOIDOSIS, UNSPECIFIED: ICD-10-CM

## 2018-04-20 DIAGNOSIS — M79.89 OTHER SPECIFIED SOFT TISSUE DISORDERS: ICD-10-CM

## 2018-04-20 PROCEDURE — 99350 HOME/RES VST EST HIGH MDM 60: CPT

## 2018-04-20 RX ORDER — TRAMADOL HYDROCHLORIDE 50 MG/1
50 TABLET, COATED ORAL
Qty: 30 | Refills: 3 | Status: DISCONTINUED | COMMUNITY
Start: 2018-03-18 | End: 2018-04-20

## 2018-04-20 RX ORDER — SERTRALINE 25 MG/1
25 TABLET, FILM COATED ORAL
Qty: 30 | Refills: 6 | Status: DISCONTINUED | COMMUNITY
Start: 2018-03-16 | End: 2018-04-20

## 2018-04-24 DIAGNOSIS — J44.9 CHRONIC OBSTRUCTIVE PULMONARY DISEASE, UNSPECIFIED: ICD-10-CM

## 2018-04-24 LAB
ANION GAP SERPL CALC-SCNC: 9 MMOL/L
BASOPHILS # BLD AUTO: 0.01 K/UL
BASOPHILS NFR BLD AUTO: 0.1 %
BUN SERPL-MCNC: 13 MG/DL
CALCIUM SERPL-MCNC: 9.3 MG/DL
CHLORIDE SERPL-SCNC: 87 MMOL/L
CHOLEST SERPL-MCNC: 238 MG/DL
CHOLEST/HDLC SERPL: 1.9 RATIO
CO2 SERPL-SCNC: 40 MMOL/L
CREAT SERPL-MCNC: 0.52 MG/DL
EOSINOPHIL # BLD AUTO: 0.01 K/UL
EOSINOPHIL NFR BLD AUTO: 0.1 %
GLUCOSE SERPL-MCNC: 144 MG/DL
HBA1C MFR BLD HPLC: 5.8 %
HCT VFR BLD CALC: 38.7 %
HDLC SERPL-MCNC: 124 MG/DL
HGB BLD-MCNC: 12.1 G/DL
IMM GRANULOCYTES NFR BLD AUTO: 0 %
LDLC SERPL CALC-MCNC: 98 MG/DL
LYMPHOCYTES # BLD AUTO: 0.66 K/UL
LYMPHOCYTES NFR BLD AUTO: 8.4 %
MAN DIFF?: NORMAL
MCHC RBC-ENTMCNC: 31.3 GM/DL
MCHC RBC-ENTMCNC: 31.9 PG
MCV RBC AUTO: 102.1 FL
MONOCYTES # BLD AUTO: 0.29 K/UL
MONOCYTES NFR BLD AUTO: 3.7 %
NEUTROPHILS # BLD AUTO: 6.89 K/UL
NEUTROPHILS NFR BLD AUTO: 87.7 %
PLATELET # BLD AUTO: 230 K/UL
POTASSIUM SERPL-SCNC: 4.8 MMOL/L
RBC # BLD: 3.79 M/UL
RBC # FLD: 14 %
SODIUM SERPL-SCNC: 136 MMOL/L
TRIGL SERPL-MCNC: 80 MG/DL
WBC # FLD AUTO: 7.86 K/UL

## 2018-04-25 RX ORDER — MONTELUKAST 10 MG/1
10 TABLET, FILM COATED ORAL
Qty: 30 | Refills: 0 | Status: ACTIVE | COMMUNITY
Start: 2018-01-02

## 2018-04-25 RX ORDER — LACTULOSE 10 G/15ML
10 SOLUTION ORAL DAILY
Qty: 1 | Refills: 3 | Status: ACTIVE | COMMUNITY
Start: 2018-02-19

## 2018-04-25 RX ORDER — ASPIRIN 81 MG
6.5 TABLET, DELAYED RELEASE (ENTERIC COATED) ORAL
Qty: 1 | Refills: 0 | Status: ACTIVE | COMMUNITY
Start: 2018-02-13

## 2018-04-25 RX ORDER — LIDOCAINE AND PRILOCAINE 25; 25 MG/G; MG/G
2.5-2.5 CREAM TOPICAL
Qty: 1 | Refills: 2 | Status: ACTIVE | COMMUNITY
Start: 2018-01-10

## 2018-04-25 RX ORDER — AMLODIPINE BESYLATE 5 MG/1
5 TABLET ORAL DAILY
Qty: 90 | Refills: 3 | Status: ACTIVE | COMMUNITY
Start: 2018-02-13

## 2018-04-25 RX ORDER — ATORVASTATIN CALCIUM 10 MG/1
10 TABLET, FILM COATED ORAL
Qty: 90 | Refills: 0 | Status: ACTIVE | COMMUNITY
Start: 2018-01-17

## 2018-04-25 RX ORDER — LACTULOSE 10 G/15ML
10 SOLUTION ORAL
Refills: 0 | Status: ACTIVE | COMMUNITY

## 2018-04-25 RX ORDER — ESCITALOPRAM OXALATE 5 MG/1
5 TABLET ORAL
Refills: 0 | Status: ACTIVE | COMMUNITY

## 2018-07-25 PROBLEM — I87.2 VENOUS INSUFFICIENCY: Status: ACTIVE | Noted: 2017-03-29

## 2021-09-22 NOTE — ED PROVIDER NOTE - MUSCULOSKELETAL NEGATIVE STATEMENT, MLM
Detail Level: Simple Detail Level: Detailed no back pain, no gout, no musculoskeletal pain, no neck pain, and no weakness.
